# Patient Record
Sex: MALE | Race: BLACK OR AFRICAN AMERICAN | NOT HISPANIC OR LATINO | Employment: OTHER | URBAN - METROPOLITAN AREA
[De-identification: names, ages, dates, MRNs, and addresses within clinical notes are randomized per-mention and may not be internally consistent; named-entity substitution may affect disease eponyms.]

---

## 2017-02-03 DIAGNOSIS — Z76.89 ESTABLISHING CARE WITH NEW DOCTOR, ENCOUNTER FOR: Primary | ICD-10-CM

## 2017-02-06 ENCOUNTER — CLINICAL SUPPORT (OUTPATIENT)
Dept: CARDIOLOGY | Facility: CLINIC | Age: 72
End: 2017-02-06
Payer: COMMERCIAL

## 2017-02-06 ENCOUNTER — OFFICE VISIT (OUTPATIENT)
Dept: CARDIOLOGY | Facility: CLINIC | Age: 72
End: 2017-02-06
Payer: COMMERCIAL

## 2017-02-06 ENCOUNTER — HOSPITAL ENCOUNTER (OUTPATIENT)
Dept: RADIOLOGY | Facility: HOSPITAL | Age: 72
Discharge: HOME | End: 2017-02-06
Attending: INTERNAL MEDICINE
Payer: COMMERCIAL

## 2017-02-06 VITALS — WEIGHT: 256.31 LBS | DIASTOLIC BLOOD PRESSURE: 84 MMHG | SYSTOLIC BLOOD PRESSURE: 134 MMHG

## 2017-02-06 DIAGNOSIS — R94.39 ABNORMAL CARDIOVASCULAR STRESS TEST: ICD-10-CM

## 2017-02-06 DIAGNOSIS — E78.00 PURE HYPERCHOLESTEROLEMIA: ICD-10-CM

## 2017-02-06 DIAGNOSIS — Z76.89 ESTABLISHING CARE WITH NEW DOCTOR, ENCOUNTER FOR: ICD-10-CM

## 2017-02-06 DIAGNOSIS — I10 ESSENTIAL HYPERTENSION: Primary | ICD-10-CM

## 2017-02-06 DIAGNOSIS — I10 ESSENTIAL HYPERTENSION: ICD-10-CM

## 2017-02-06 PROCEDURE — 99205 OFFICE O/P NEW HI 60 MIN: CPT | Mod: S$PBB,,, | Performed by: INTERNAL MEDICINE

## 2017-02-06 PROCEDURE — 71020 XR CHEST PA AND LATERAL: CPT | Mod: TC

## 2017-02-06 PROCEDURE — 71020 XR CHEST PA AND LATERAL: CPT | Mod: 26,,, | Performed by: RADIOLOGY

## 2017-02-06 PROCEDURE — 99212 OFFICE O/P EST SF 10 MIN: CPT | Mod: PBBFAC | Performed by: INTERNAL MEDICINE

## 2017-02-06 PROCEDURE — 93010 ELECTROCARDIOGRAM REPORT: CPT | Mod: S$PBB,,, | Performed by: INTERNAL MEDICINE

## 2017-02-06 PROCEDURE — 99999 PR PBB SHADOW E&M-EST. PATIENT-LVL II: CPT | Mod: PBBFAC,,, | Performed by: INTERNAL MEDICINE

## 2017-02-06 PROCEDURE — 93005 ELECTROCARDIOGRAM TRACING: CPT | Mod: PBBFAC | Performed by: INTERNAL MEDICINE

## 2017-02-06 RX ORDER — ATORVASTATIN CALCIUM 80 MG/1
80 TABLET, FILM COATED ORAL DAILY
COMMUNITY

## 2017-02-06 RX ORDER — SERTRALINE HYDROCHLORIDE 100 MG/1
100 TABLET, FILM COATED ORAL DAILY
COMMUNITY

## 2017-02-06 RX ORDER — ALLOPURINOL 300 MG/1
300 TABLET ORAL DAILY
COMMUNITY

## 2017-02-06 RX ORDER — LISINOPRIL AND HYDROCHLOROTHIAZIDE 12.5; 2 MG/1; MG/1
1 TABLET ORAL DAILY
COMMUNITY

## 2017-02-06 RX ORDER — FOLIC ACID/MULTIVIT,IRON,MINER 0.4MG-18MG
TABLET ORAL
COMMUNITY

## 2017-02-06 RX ORDER — FLUTICASONE PROPIONATE 50 MCG
1 SPRAY, SUSPENSION (ML) NASAL DAILY
COMMUNITY

## 2017-02-06 RX ORDER — SILDENAFIL 100 MG/1
100 TABLET, FILM COATED ORAL DAILY PRN
COMMUNITY

## 2017-02-06 NOTE — PROGRESS NOTES
Subjective:   Patient ID:  Mahin Newton is a 71 y.o. male who presents for follow-up of abnormal stress test.   Stress echo (+) in LAD territory.Stress test ordered for preop clearance. Pt with CP relieved with H2 blockers.  Pt with fatigue and ZAPIEN occasionally. Pt also occasional fatigue lately.    Hypertension   This is a chronic problem. The current episode started more than 1 year ago. The problem has been gradually improving since onset. The problem is controlled. Pertinent negatives include no chest pain, palpitations or shortness of breath. Past treatments include ACE inhibitors. The current treatment provides moderate improvement. Compliance problems include exercise.    Hyperlipidemia   This is a chronic problem. The current episode started more than 1 year ago. The problem is controlled. Recent lipid tests were reviewed and are variable. Pertinent negatives include no chest pain or shortness of breath. Current antihyperlipidemic treatment includes statins. The current treatment provides moderate improvement of lipids. Compliance problems include adherence to exercise.        Review of Systems   Constitution: Negative. Negative for weight gain.   HENT: Negative.    Eyes: Negative.    Cardiovascular: Negative.  Negative for chest pain, leg swelling and palpitations.   Respiratory: Negative.  Negative for shortness of breath.    Endocrine: Negative.    Hematologic/Lymphatic: Negative.    Skin: Negative.    Musculoskeletal: Negative for muscle weakness.   Gastrointestinal: Negative.    Genitourinary: Negative.    Neurological: Negative.  Negative for dizziness.   Psychiatric/Behavioral: Negative.    Allergic/Immunologic: Negative.      No family history on file.  Past Medical History   Diagnosis Date    Diabetes mellitus     Gout     Hyperlipidemia     Hypertension      No current outpatient prescriptions on file prior to visit.     No current facility-administered medications on file prior to visit.       Review of patient's allergies indicates:  No Known Allergies    Objective:     Physical Exam   Constitutional: He is oriented to person, place, and time. He appears well-developed and well-nourished.   HENT:   Head: Normocephalic and atraumatic.   Eyes: Conjunctivae are normal. Pupils are equal, round, and reactive to light.   Neck: Normal range of motion. Neck supple.   Cardiovascular: Normal rate, regular rhythm, normal heart sounds and intact distal pulses.    Pulmonary/Chest: Effort normal and breath sounds normal.   Abdominal: Soft. Bowel sounds are normal.   Neurological: He is alert and oriented to person, place, and time.   Skin: Skin is warm and dry.   Psychiatric: He has a normal mood and affect.   Nursing note and vitals reviewed.      Assessment:     1. Essential hypertension    2. Pure hypercholesterolemia    3. Abnormal cardiovascular stress test        Plan:     Essential hypertension    Pure hypercholesterolemia    Abnormal cardiovascular stress test      C  continue statin-hlp  Continue lisinopril-htn

## 2017-02-06 NOTE — MR AVS SNAPSHOT
[facility]  [address]  [place] [address]  Phone: [phone]  Fax: [phone]                  Mahin Newton   2017 9:20 AM   Office Visit    Description:  Male : 1945   Provider:  García Shaffer MD   Department:  [facility]           Diagnoses this Visit        Comments    Essential hypertension    -  Primary     Pure hypercholesterolemia         Abnormal cardiovascular stress test                To Do List           Goals (5 Years of Data)     None      Follow-Up and Disposition     Return in about 2 months (around 2017).      [facility]     [facility] On Call Nurse Care Line -  Assistance  Registered nurses in the [facility] Call Center provide clinical advisement, health education, appointment booking, and other advisory services.  Call for this free service at [phone].             Medications           Message regarding Medications     Verify the changes and/or additions to your medication regime listed below are the same as discussed with your clinician today.  If any of these changes or additions are incorrect, please notify your healthcare provider.             Verify that the below list of medications is an accurate representation of the medications you are currently taking.  If none reported, the list may be blank. If incorrect, please contact your healthcare provider. Carry this list with you in case of emergency.           Current Medications     allopurinol (ZYLOPRIM) 300 MG tablet Take 300 mg by mouth once daily.    atorvastatin (LIPITOR) 80 MG tablet Take 80 mg by mouth once daily.    cholecalciferol, vitamin D3, (VITAMIN D3) 400 unit Chew Take by mouth. Take two tablets by mouth daily    fluticasone (FLONASE) 50 mcg/actuation nasal spray 1 spray by Each Nare route once daily.    lisinopril-hydrochlorothiazide (PRINZIDE,ZESTORETIC) 20-12.5 mg per tablet Take 1 tablet by mouth once daily.    sertraline (ZOLOFT) 100 MG  tablet Take 100 mg by mouth once daily.    sildenafil (VIAGRA) 100 MG tablet Take 100 mg by mouth daily as needed for Erectile Dysfunction.           Clinical Reference Information           Your Vitals Were     BP Weight                134/84 (BP Location: Right arm, Patient Position: Sitting, BP Method: Manual) 116.3 kg (256 lb 4.6 oz)          Blood Pressure          Most Recent Value    Right Arm BP - Sitting  134/84    Left Arm BP - Sitting  138/88    BP  134/84      Allergies as of 2/6/2017     No Known Allergies      Immunizations Administered on Date of Encounter - 2/6/2017     None      Orders Placed During Today's Visit     Future Labs/Procedures Expected by Expires    Cath lab procedure  As directed 2/6/2018      [facility] Sign-Up     Activating your [facility] account is as easy as 1-2-3!     1) Visit [email or web address], select Sign Up Now, enter this activation code and your date of birth, then select Next.  [email or web address]  Expires: 3/23/2017 10:30 AM      2) Create a username and password to use when you visit [facility] in the future and select a security question in case you lose your password and select Next.    3) Enter your e-mail address and click Sign Up!    Additional Information  If you have questions, please e-mail [email or web address] or call [phone] to talk to our [facility] staff. Remember, [facility] is NOT to be used for urgent needs. For medical emergencies, dial 911.         Language Assistance Services     ATTENTION: Language assistance services are available, free of charge. Please call [phone].      ATENCIÓN: Si habla español, tiene a segundo disposición servicios gratuitos de asistencia lingüística. Llame al [phone].     CHÚ Ý: N?u b?n nói Ti?ng Vi?t, có các d?ch v? h? tr? ngôn ng? mi?n phí dành cho b?n. G?i s? [phone].         [facility] complies with applicable Federal civil rights laws and does not discriminate on the basis of race, color, national  origin, age, disability, or sex.

## 2017-02-09 ENCOUNTER — HOSPITAL ENCOUNTER (OUTPATIENT)
Facility: HOSPITAL | Age: 72
Discharge: HOME | End: 2017-02-09
Attending: INTERNAL MEDICINE | Admitting: INTERNAL MEDICINE
Payer: COMMERCIAL

## 2017-02-09 VITALS
OXYGEN SATURATION: 97 % | WEIGHT: 251 LBS | HEIGHT: 70 IN | DIASTOLIC BLOOD PRESSURE: 66 MMHG | SYSTOLIC BLOOD PRESSURE: 114 MMHG | TEMPERATURE: 97 F | RESPIRATION RATE: 18 BRPM | HEART RATE: 82 BPM | BODY MASS INDEX: 35.93 KG/M2

## 2017-02-09 DIAGNOSIS — R94.39 ABNORMAL CARDIOVASCULAR STRESS TEST: ICD-10-CM

## 2017-02-09 DIAGNOSIS — R07.9 CHEST PAIN, UNSPECIFIED TYPE: ICD-10-CM

## 2017-02-09 DIAGNOSIS — I10 ESSENTIAL (PRIMARY) HYPERTENSION: ICD-10-CM

## 2017-02-09 PROCEDURE — 63600175 PHARM REV CODE 636 W HCPCS

## 2017-02-09 PROCEDURE — C1769 GUIDE WIRE: HCPCS

## 2017-02-09 PROCEDURE — 25000003 PHARM REV CODE 250

## 2017-02-09 PROCEDURE — 93458 L HRT ARTERY/VENTRICLE ANGIO: CPT | Mod: 26,,, | Performed by: INTERNAL MEDICINE

## 2017-02-09 RX ORDER — NITROGLYCERIN 0.4 MG/1
0.4 TABLET SUBLINGUAL EVERY 5 MIN PRN
Status: DISCONTINUED | OUTPATIENT
Start: 2017-02-09 | End: 2017-02-09 | Stop reason: HOSPADM

## 2017-02-09 RX ORDER — DIPHENHYDRAMINE HCL 50 MG
50 CAPSULE ORAL ONCE
Status: COMPLETED | OUTPATIENT
Start: 2017-02-09 | End: 2017-02-09

## 2017-02-09 RX ORDER — ATROPINE SULFATE 0.1 MG/ML
0.5 INJECTION INTRAVENOUS
Status: DISCONTINUED | OUTPATIENT
Start: 2017-02-09 | End: 2017-02-09 | Stop reason: HOSPADM

## 2017-02-09 RX ORDER — SODIUM CHLORIDE 9 MG/ML
INJECTION, SOLUTION INTRAVENOUS CONTINUOUS
Status: DISCONTINUED | OUTPATIENT
Start: 2017-02-09 | End: 2017-02-09 | Stop reason: HOSPADM

## 2017-02-09 RX ORDER — DIAZEPAM 5 MG/1
5 TABLET ORAL
Status: DISCONTINUED | OUTPATIENT
Start: 2017-02-09 | End: 2017-02-09 | Stop reason: HOSPADM

## 2017-02-09 RX ORDER — HYDROCODONE BITARTRATE AND ACETAMINOPHEN 5; 325 MG/1; MG/1
1 TABLET ORAL EVERY 4 HOURS PRN
Status: DISCONTINUED | OUTPATIENT
Start: 2017-02-09 | End: 2017-02-09 | Stop reason: HOSPADM

## 2017-02-09 RX ORDER — ACETAMINOPHEN 325 MG/1
650 TABLET ORAL EVERY 4 HOURS PRN
Status: DISCONTINUED | OUTPATIENT
Start: 2017-02-09 | End: 2017-02-09 | Stop reason: HOSPADM

## 2017-02-09 RX ORDER — OXYCODONE HYDROCHLORIDE 5 MG/1
10 TABLET ORAL EVERY 4 HOURS PRN
Status: DISCONTINUED | OUTPATIENT
Start: 2017-02-09 | End: 2017-02-09 | Stop reason: HOSPADM

## 2017-02-09 RX ORDER — METOPROLOL TARTRATE 1 MG/ML
5 INJECTION, SOLUTION INTRAVENOUS EVERY 8 HOURS
Status: DISCONTINUED | OUTPATIENT
Start: 2017-02-09 | End: 2017-02-09 | Stop reason: HOSPADM

## 2017-02-09 RX ADMIN — SODIUM CHLORIDE: 9 INJECTION, SOLUTION INTRAVENOUS at 09:02

## 2017-02-09 RX ADMIN — Medication 50 MG: at 09:02

## 2017-02-09 RX ADMIN — DIAZEPAM 5 MG: 5 TABLET ORAL at 09:02

## 2017-02-09 NOTE — PROCEDURES
Procedure Date: 02/09/2017  Procedure:cardioversion  Assistant: none  Specimen: none  : ELFEGO RAMESH MD  Diagnosis:atrial fib w/ RVR  Sedation:conscious  Complications:none  Blood loss: none  Findings:DCCV 100 J, 200J x 2 but persistant atrial fibrillation, start IV lopressor

## 2017-02-09 NOTE — PLAN OF CARE
Problem: Patient Care Overview  Goal: Individualization & Mutuality  Outcome: Ongoing (interventions implemented as appropriate)  Shea FOSTER AT BEDSIDE [phone]

## 2017-02-09 NOTE — PLAN OF CARE
Problem: Patient Care Overview  Goal: Discharge Needs Assessment  Outcome: Outcome(s) achieved Date Met:  02/09/17  1230 ambulated around unit w/o problems.  DISCHARGE INST. REVIEWED, COPY GIVEN.  1235 IV REMOVED.  1240 DISCHARGE HOME. TO FRONT DOORS VIA W/C

## 2017-02-09 NOTE — BRIEF OP NOTE
Procedure Date: 02/09/2017  Procedure:[facility]  Assistant: none  Specimen: none  : ELFEGO RAMESH MD  Diagnosis:(+) stress test  Sedation:conscious  Complications:none  Blood loss :< 50 cc  Findings:normal coronaries, nml lv function

## 2017-02-09 NOTE — DISCHARGE INSTRUCTIONS
"Post-op Heart Catheterization    1. DIET: It is advisable for you to follow a diet that limits the intake of salt, sugar, saturated fats and cholesterol.     2. DRIVING: Due to sedation you received during your procedure, DO NOT drive or operate machinery for 24 hours. Avoid making critical decisions or signing legal documents until tomorrow.    3. ACTIVITY: AVOID activities that require bending of the affected arm/wrist for 3 days and submerging the site in water for 3 days. REMOVE the dressing the day after  the procedure, you may apply a bandaid to the site if you desire. You may RESUME       your normal activities or prescribed exercise program as instructed by your physician after 3 days.                                                                                                                 4. WOUND CARE: It is not unusual to have a small amount of bruising to appear at or near the puncture site. It is also common to have a tender "knot" develop beneath the skin at the puncture site of the wrist/arm. This is usually scar tissue and is not a cause for concern or alarm. This tender knot may take several weeks to fully resolve. The bruise will usually spread over several days. However, if the lump gets bigger, call your doctor immediately.    5. DISCOMFORT: For general discomfort at the puncture site, you may take 1 or 2 Acetaminophen (Tylenol) tablets every 4 - 6 hours as needed. (Do not take more than 4000 mg a day)    6. SIGNS AND SYMPTOMS TO REPORT:  Call your physician immediately if any of the following occur:                                            1. Loss of feeling, warmth or color to the affected arm/wrist                                                                                                          2. Mild beeding from the site                 3. Pain that is sudden, sharp or persistent in the affected arm/wrist                 4. Swelling or a change in "lump" size, increased " redness or drainage at                               the puncture site                                                                               5. High fever (101 degrees or higher)    7. GO TO  THE EMERGENCY ROOM OR CALL 911 IF YOU HAVE: Chest pains or discomforts not relieved with 3 nitroglycerin doses (sublingual tablets or spray), numbness or severe pain of limb, if your limb becomes cold or discolored or if you develop uncontrolled bleeding from the puncture site (quickly apply firm, direct pressure above the site).

## 2017-02-09 NOTE — IP AVS SNAPSHOT
[facility]  [id number] [place] [address] [address] [place] [place] [id number]           Patient Discharge Instructions     Our goal is to set you up for success. This packet includes information on your condition, medications, and your home care. It will help you to care for yourself so you don't get sicker and need to go back to the hospital.     Please ask your nurse if you have any questions.        There are many details to remember when preparing to leave the hospital. Here is what you will need to do:    1. Take your medicine. If you are prescribed medications, review your Medication List in the following pages. You may have new medications to  at the pharmacy and others that you'll need to stop taking. Review the instructions for how and when to take your medications. Talk with your doctor or nurses if you are unsure of what to do.     2. Go to your follow-up appointments. Specific follow-up information is listed in the following pages. Your may be contacted by a transition nurse or clinical provider about future appointments. Be sure we have all of the phone numbers to reach you, if needed. Please contact your provider's office if you are unable to make an appointment.     3. Watch for warning signs. Your doctor or nurse will give you detailed warning signs to watch for and when to call for assistance. These instructions may also include educational information about your condition. If you experience any of warning signs to your health, call your doctor.               [facility]  Unless otherwise directed by your provider, please contact [facility], our nurse care line that is available for 24/7 assistance.     [phone] (toll-free)    Registered nurses in the [facility] provide clinical advisement, health education, appointment booking, and other advisory services.                    ** Verify the list of medication(s) below is accurate and up to date. Carry this with you  in case of emergency. If your medications have changed, please notify your healthcare provider.             Medication List      CONTINUE taking these medications        Additional Info                      allopurinol 300 MG tablet   Commonly known as:  ZYLOPRIM   Refills:  0   Dose:  300 mg    Instructions:  Take 300 mg by mouth once daily.     Begin Date    AM    Noon    PM    Bedtime       atorvastatin 80 MG tablet   Commonly known as:  LIPITOR   Refills:  0   Dose:  80 mg    Instructions:  Take 80 mg by mouth once daily.     Begin Date    AM    Noon    PM    Bedtime       fluticasone 50 mcg/actuation nasal spray   Commonly known as:  FLONASE   Refills:  0   Dose:  1 spray    Instructions:  1 spray by Each Nare route once daily.     Begin Date    AM    Noon    PM    Bedtime       lisinopril-hydrochlorothiazide 20-12.5 mg per tablet   Commonly known as:  PRINZIDE,ZESTORETIC   Refills:  0   Dose:  1 tablet    Instructions:  Take 1 tablet by mouth once daily.     Begin Date    AM    Noon    PM    Bedtime       sertraline 100 MG tablet   Commonly known as:  ZOLOFT   Refills:  0   Dose:  100 mg    Instructions:  Take 100 mg by mouth once daily.     Begin Date    AM    Noon    PM    Bedtime       sildenafil 100 MG tablet   Commonly known as:  VIAGRA   Refills:  0   Dose:  100 mg    Instructions:  Take 100 mg by mouth daily as needed for Erectile Dysfunction.     Begin Date    AM    Noon    PM    Bedtime       VITAMIN D3 400 unit Chew   Refills:  0   Generic drug:  cholecalciferol (vitamin D3)    Instructions:  Take by mouth. Take two tablets by mouth daily     Begin Date    AM    Noon    PM    Bedtime                  Please bring to all follow up appointments:    1. A copy of your discharge instructions.  2. All medicines you are currently taking in their original bottles.  3. Identification and insurance card.    Please arrive 15 minutes ahead of scheduled appointment time.    Please call 24 hours in advance if you  "must reschedule your appointment and/or time.        Follow-up Information     Follow up In 1 week.    Why:  For wound re-check        Discharge Instructions     Future Orders    Activity as tolerated     Comments:    Refer to nursing instructions    Call MD for:  difficulty breathing, headache or visual disturbances     Call MD for:  extreme fatigue     Call MD for:  hives     Call MD for:  persistent dizziness or light-headedness     Call MD for:  persistent nausea and vomiting     Call MD for:  redness, tenderness, or signs of infection (pain, swelling, redness, odor or green/yellow discharge around incision site)     Call MD for:  severe uncontrolled pain     Call MD for:  temperature >100.4     Diet general     Questions:    Total calories:      Fat restriction, if any:      Protein restriction, if any:      Na restriction, if any:      Fluid restriction:      Additional restrictions:  Cardiac (Low Na/Chol)        Discharge Instructions       Post-op Heart Catheterization    1. DIET: It is advisable for you to follow a diet that limits the intake of salt, sugar, saturated fats and cholesterol.     2. DRIVING: Due to sedation you received during your procedure, DO NOT drive or operate machinery for 24 hours. Avoid making critical decisions or signing legal documents until tomorrow.    3. ACTIVITY: AVOID activities that require bending of the affected arm/wrist for 3 days and submerging the site in water for 3 days. REMOVE the dressing the day after  the procedure, you may apply a bandaid to the site if you desire. You may RESUME       your normal activities or prescribed exercise program as instructed by your physician after 3 days.                                                                                                                 4. WOUND CARE: It is not unusual to have a small amount of bruising to appear at or near the puncture site. It is also common to have a tender "knot" develop beneath the " "skin at the puncture site of the wrist/arm. This is usually scar tissue and is not a cause for concern or alarm. This tender knot may take several weeks to fully resolve. The bruise will usually spread over several days. However, if the lump gets bigger, call your doctor immediately.    5. DISCOMFORT: For general discomfort at the puncture site, you may take 1 or 2 Acetaminophen (Tylenol) tablets every 4 - 6 hours as needed. (Do not take more than 4000 mg a day)    6. SIGNS AND SYMPTOMS TO REPORT:  Call your physician immediately if any of the following occur:                                            1. Loss of feeling, warmth or color to the affected arm/wrist                                                                                                          2. Mild beeding from the site                 3. Pain that is sudden, sharp or persistent in the affected arm/wrist                 4. Swelling or a change in "lump" size, increased redness or drainage at                               the puncture site                                                                               5. High fever (101 degrees or higher)    7. GO TO  THE EMERGENCY ROOM OR CALL 911 IF YOU HAVE: Chest pains or discomforts not relieved with 3 nitroglycerin doses (sublingual tablets or spray), numbness or severe pain of limb, if your limb becomes cold or discolored or if you develop uncontrolled bleeding from the puncture site (quickly apply firm, direct pressure above the site).        Admission Information     Date & Time Provider Department CSN    2/9/2017  8:27 AM García Shaffer MD [facility] [id number]      Care Providers     Provider Role Specialty Primary office phone    García Shaffer MD Attending Provider Cardiology [phone]    García Shaffer MD Surgeon  Cardiology [phone]      Your Vitals Were     BP Pulse Temp Resp Height Weight    149/82 63 97.4 °F (36.3 °C) (Oral) 19 5' 10" " (1.778 m) 113.9 kg (251 lb)    SpO2 BMI             94% 36.01 kg/m2         Recent Lab Values     No lab values to display.      Allergies as of 2/9/2017     No Known Allergies      Advance Directives     An advance directive is a document which, in the event you are no longer able to make decisions for yourself, tells your healthcare team what kind of treatment you do or do not want to receive, or who you would like to make those decisions for you.  If you do not currently have an advance directive, [facility] encourages you to create one.  For more information call:  [phone]), [phone] ([phone]),  or log on to [email or web address].        Smoking Cessation     If you would like to quit smoking:   You may be eligible for free services if you are a [place] resident and started smoking cigarettes before September 1, 1988.  Call the Smoking Cessation [employer] ([facility]) toll free at (905) [phone] or (715) 996-8226.   Call 1-395-QUIT-NOW if you do not meet the above criteria.            Language Assistance Services     ATTENTION: Language assistance services are available, free of charge. Please call [phone].      ATENCIÓN: Si habla carl, tiene a segundo disposición servicios gratuitos de asistencia lingüística. Llame al [phone].     CHÚ Ý: N?u b?n nói Ti?ng Vi?t, có các d?ch v? h? tr? ngôn ng? mi?n phí dành cho b?n. G?i s? [phone].        [facility] Sign-Up     Activating your [facility] account is as easy as 1-2-3!     1) Visit [email or web address], select Sign Up Now, enter this activation code and your date of birth, then select Next.  [email or web address]  Expires: 3/23/2017 10:30 AM      2) Create a username and password to use when you visit [facility] in the future and select a security question in case you lose your password and select Next.    3) Enter your e-mail address and click Sign Up!    Additional Information  If you have questions, please e-mail [email or web address]  or call [phone] to talk to our [facility] staff. Remember, [facility] is NOT to be used for urgent needs. For medical emergencies, dial 911.          [facility] complies with applicable Federal civil rights laws and does not discriminate on the basis of race, color, national origin, age, disability, or sex.

## 2017-02-12 NOTE — DISCHARGE SUMMARY
[facility]  Discharge Summary      Admit Date: 2/9/2017    Discharge Date and Time: 2/9/2017  Attending Physician: García Shaffer MD  Reason for Admission: [facility]    Procedures Performed: Procedure(s) (LRB):  HEART CATH-LEFT (Left)    Hospital Course (synopsis of major diagnoses, care, treatment, and services provided during the course of the hospital stay): abnormal stress test    Consults: none    Significant Diagnostic Studies: Angiography: [facility]-normal    Final Diagnoses:    Principal Problem: Abnormal stress test   Secondary Diagnoses:   Active Hospital Problems    Diagnosis  POA    Abnormal cardiovascular stress test [R94.39]  Yes     Priority: High      Resolved Hospital Problems    Diagnosis Date Resolved POA   No resolved problems to display.       Discharged Condition: good    Disposition: Home or Self Care    Follow Up/Patient Instructions:     Medications:  Reconciled Home Medications:   Discharge Medication List as of 2/9/2017 11:19 AM      CONTINUE these medications which have NOT CHANGED    Details   allopurinol (ZYLOPRIM) 300 MG tablet Take 300 mg by mouth once daily., Until Discontinued, Historical Med      atorvastatin (LIPITOR) 80 MG tablet Take 80 mg by mouth once daily., Until Discontinued, Historical Med      cholecalciferol, vitamin D3, (VITAMIN D3) 400 unit Chew Take by mouth. Take two tablets by mouth daily, Until Discontinued, Historical Med      fluticasone (FLONASE) 50 mcg/actuation nasal spray 1 spray by Each Nare route once daily., Until Discontinued, Historical Med      lisinopril-hydrochlorothiazide (PRINZIDE,ZESTORETIC) 20-12.5 mg per tablet Take 1 tablet by mouth once daily., Until Discontinued, Historical Med      sertraline (ZOLOFT) 100 MG tablet Take 100 mg by mouth once daily., Until Discontinued, Historical Med      sildenafil (VIAGRA) 100 MG tablet Take 100 mg by mouth daily as needed for Erectile Dysfunction., Until Discontinued, Historical Med              Discharge Procedure Orders  Diet general   Order Specific Question Answer Comments   Additional restrictions: Cardiac (Low Na/Chol)      Activity as tolerated   Order Comments: Refer to nursing instructions     Call MD for:  temperature >100.4     Call MD for:  persistent nausea and vomiting     Call MD for:  severe uncontrolled pain     Call MD for:  difficulty breathing, headache or visual disturbances     Call MD for:  redness, tenderness, or signs of infection (pain, swelling, redness, odor or green/yellow discharge around incision site)     Call MD for:  hives     Call MD for:  persistent dizziness or light-headedness     Call MD for:  extreme fatigue       Follow-up Information     Follow up In 1 week.    Why:  For wound re-check

## 2024-01-05 ENCOUNTER — OFFICE VISIT (OUTPATIENT)
Dept: OPHTHALMOLOGY | Facility: CLINIC | Age: 79
End: 2024-01-05
Payer: MEDICARE

## 2024-01-05 DIAGNOSIS — H25.13 CATARACT, NUCLEAR SCLEROTIC SENILE, BILATERAL: ICD-10-CM

## 2024-01-05 DIAGNOSIS — H52.7 REFRACTIVE ERRORS: ICD-10-CM

## 2024-01-05 DIAGNOSIS — H40.013 AT LOW RISK FOR OPEN-ANGLE GLAUCOMA IN BOTH EYES: Primary | ICD-10-CM

## 2024-01-05 PROCEDURE — 99202 OFFICE O/P NEW SF 15 MIN: CPT | Mod: PBBFAC | Performed by: OPTOMETRIST

## 2024-01-05 PROCEDURE — 92004 COMPRE OPH EXAM NEW PT 1/>: CPT | Mod: S$PBB,,, | Performed by: OPTOMETRIST

## 2024-01-05 PROCEDURE — 92015 DETERMINE REFRACTIVE STATE: CPT | Mod: ,,, | Performed by: OPTOMETRIST

## 2024-01-05 PROCEDURE — 99999 PR PBB SHADOW E&M-NEW PATIENT-LVL II: CPT | Mod: PBBFAC,,, | Performed by: OPTOMETRIST

## 2024-01-05 NOTE — PROGRESS NOTES
"SUBJECTIVE  Mahin Newton is 78 y.o. male  Corrected distance visual acuity was 20/20 -1 in the right eye and 20/20 -2 in the left eye. Corrected near visual acuity was J1 in the right eye and J1 in the left eye.   Chief Complaint   Patient presents with    Diabetic Eye Exam    Hypertensive Eye Exam    Eye Exam          HPI    NIDDM exam   No visual complaints  New patient last eye exam 1 year  Update glasses RX.  No results found for: "LABA1C", "HGBA1C"    Last edited by Puja Grimes MA on 1/5/2024  3:51 PM.         Assessment /Plan :  1. At low risk for open-angle glaucoma in both eyes   Elevated IOP OU, normal ONH, normal gOCT GCL 32'S     2. Cataract, nuclear sclerotic senile, bilateral   Moderate cataracts OU, not surgical  Follow annually.     3. Refractive errors   Dispense Final Rx for glasses.  RTC 1 year VF gOCT DFE  Discussed above and answered questions.                 "

## 2024-06-24 ENCOUNTER — OFFICE VISIT (OUTPATIENT)
Dept: UROLOGY | Facility: CLINIC | Age: 79
End: 2024-06-24
Payer: MEDICARE

## 2024-06-24 ENCOUNTER — LAB VISIT (OUTPATIENT)
Dept: LAB | Facility: HOSPITAL | Age: 79
End: 2024-06-24
Attending: NURSE PRACTITIONER
Payer: MEDICARE

## 2024-06-24 VITALS
DIASTOLIC BLOOD PRESSURE: 67 MMHG | HEIGHT: 70 IN | BODY MASS INDEX: 35.07 KG/M2 | SYSTOLIC BLOOD PRESSURE: 125 MMHG | HEART RATE: 94 BPM | WEIGHT: 244.94 LBS

## 2024-06-24 DIAGNOSIS — Z12.5 PROSTATE CANCER SCREENING: ICD-10-CM

## 2024-06-24 DIAGNOSIS — N40.0 BENIGN PROSTATIC HYPERPLASIA WITHOUT LOWER URINARY TRACT SYMPTOMS: Primary | ICD-10-CM

## 2024-06-24 PROBLEM — R94.39 ABNORMAL CARDIOVASCULAR STRESS TEST: Status: RESOLVED | Noted: 2017-02-09 | Resolved: 2024-06-24

## 2024-06-24 LAB
BILIRUB UR QL STRIP: NEGATIVE
COMPLEXED PSA SERPL-MCNC: 1.2 NG/ML (ref 0–4)
GLUCOSE UR QL STRIP: POSITIVE
KETONES UR QL STRIP: NEGATIVE
LEUKOCYTE ESTERASE UR QL STRIP: POSITIVE
PH, POC UA: 7.5
POC BLOOD, URINE: POSITIVE
POC NITRATES, URINE: NEGATIVE
POC RESIDUAL URINE VOLUME: 75 ML (ref 0–100)
PROT UR QL STRIP: POSITIVE
SP GR UR STRIP: 1.02 (ref 1–1.03)
UROBILINOGEN UR STRIP-ACNC: 0.2 (ref 0.3–2.2)

## 2024-06-24 PROCEDURE — 99999PBSHW POCT BLADDER SCAN: Mod: PBBFAC,,,

## 2024-06-24 PROCEDURE — 36415 COLL VENOUS BLD VENIPUNCTURE: CPT | Performed by: NURSE PRACTITIONER

## 2024-06-24 PROCEDURE — 99204 OFFICE O/P NEW MOD 45 MIN: CPT | Mod: S$PBB,,, | Performed by: NURSE PRACTITIONER

## 2024-06-24 PROCEDURE — 87088 URINE BACTERIA CULTURE: CPT | Performed by: NURSE PRACTITIONER

## 2024-06-24 PROCEDURE — 51798 US URINE CAPACITY MEASURE: CPT | Mod: PBBFAC | Performed by: NURSE PRACTITIONER

## 2024-06-24 PROCEDURE — 84153 ASSAY OF PSA TOTAL: CPT | Performed by: NURSE PRACTITIONER

## 2024-06-24 PROCEDURE — 87147 CULTURE TYPE IMMUNOLOGIC: CPT | Performed by: NURSE PRACTITIONER

## 2024-06-24 PROCEDURE — 81003 URINALYSIS AUTO W/O SCOPE: CPT | Mod: PBBFAC | Performed by: NURSE PRACTITIONER

## 2024-06-24 PROCEDURE — 99999 PR PBB SHADOW E&M-EST. PATIENT-LVL III: CPT | Mod: PBBFAC,,, | Performed by: NURSE PRACTITIONER

## 2024-06-24 PROCEDURE — 99213 OFFICE O/P EST LOW 20 MIN: CPT | Mod: PBBFAC,25 | Performed by: NURSE PRACTITIONER

## 2024-06-24 PROCEDURE — 99999PBSHW POCT URINALYSIS, DIPSTICK, AUTOMATED, W/O SCOPE: Mod: PBBFAC,,,

## 2024-06-24 PROCEDURE — 87086 URINE CULTURE/COLONY COUNT: CPT | Performed by: NURSE PRACTITIONER

## 2024-06-24 RX ORDER — CARBOXYMETHYLCELLULOSE SODIUM 5 MG/ML
0.4 SOLUTION/ DROPS OPHTHALMIC 3 TIMES DAILY PRN
COMMUNITY

## 2024-06-24 RX ORDER — TAMSULOSIN HYDROCHLORIDE 0.4 MG/1
0.4 CAPSULE ORAL DAILY
Qty: 30 CAPSULE | Refills: 11 | Status: SHIPPED | OUTPATIENT
Start: 2024-06-24 | End: 2025-06-24

## 2024-06-24 RX ORDER — LANOLIN ALCOHOL/MO/W.PET/CERES
100 CREAM (GRAM) TOPICAL DAILY
COMMUNITY

## 2024-06-24 RX ORDER — AMLODIPINE BESYLATE 10 MG/1
10 TABLET ORAL
COMMUNITY

## 2024-06-24 NOTE — PROGRESS NOTES
Chief Complaint:   Prostate cancer screening  BPH    HPI:   Patient is a 78-year-old male that is presenting with slow urinary stream.  Reports he has not been seen by urologist.  No  cancers in his family.  Denies gross hematuria.  No BPH meds.  Urine in clinic indicates leukocytes, blood and glucose, all other parameters are negative.  PVR 75 mL.  Has a remote history of renal stones.  Denies flank pain.      Allergies:  Patient has no known allergies.    Medications:  has a current medication list which includes the following prescription(s): allopurinol, atorvastatin, cholecalciferol (vitamin d3), lisinopril-hydrochlorothiazide, sertraline, amlodipine, carboxymethylcellulose, cyanocobalamin, multivitamin with minerals, and tamsulosin.    Review of Systems:  General: No fever, chills, fatigability, or weight loss.  Skin: No rashes, itching, or changes in color or texture of skin.  Chest: Denies ZAPIEN, cyanosis, wheezing, cough, and sputum production.  Abdomen: Appetite fine. No weight loss. Denies diarrhea, abdominal pain, hematemesis, or blood in stool.  Musculoskeletal: No joint stiffness or swelling. Denies back pain.  : As above.  All other review of systems negative.    PMH:   has a past medical history of Diabetes mellitus, Gout, Hyperlipidemia, and Hypertension.  Renal stones, morbid obesity and BPH    PSH:  Laser lithotripsy    FamHx:  None    SocHx:  reports that he has quit smoking. He does not have any smokeless tobacco history on file. He reports current alcohol use. He reports that he does not use drugs.      Physical Exam:  Vitals:    06/24/24 1258   BP: 125/67   Pulse: 94     General:  Morbidly obese male, A&Ox3, no apparent distress, no deformities  Neck: No masses, normal thyroid  Lungs: normal inspiration, no use of accessory muscles  Heart: normal pulse, no arrhythmias  Abdomen: Soft, NT, ND, no masses, no hernias, no hepatosplenomegaly  Lymphatic: Neck and groin nodes negative  Skin: The  skin is warm and dry. No jaundice.  Ext: No c/c/e.  : Test desc jacques, no abnormalities of epididymus. Penis uncircumcised with normal penile and scrotal skin. Meatus normal. Normal rectal tone, no hemorrhoids. Prost 35 gm no nodules or masses appreciated. SV not palpable. Perineum and anus normal.    Labs/Studies:   See HPI    Impression/Plan:   1. Prostate cancer screening  [facility] is unremarkable, patient was sent for PSA labs.  Does not use the patient portal [employer] functionality, will call him with results and needed follow-up.    2. BPH  Patient was educated on behavior modifications, decrease caffeine, sodas and increase water, to assist with decreasing BPH symptoms.  Tamsulosin was sent to his local pharmacy and patient to return to clinic in 6-8 weeks for re-evaluation.

## 2024-06-26 ENCOUNTER — TELEPHONE (OUTPATIENT)
Dept: UROLOGY | Facility: CLINIC | Age: 79
End: 2024-06-26
Payer: MEDICARE

## 2024-06-26 LAB — BACTERIA UR CULT: ABNORMAL

## 2024-06-26 RX ORDER — AMOXICILLIN AND CLAVULANATE POTASSIUM 875; 125 MG/1; MG/1
1 TABLET, FILM COATED ORAL 2 TIMES DAILY
Qty: 14 TABLET | Refills: 0 | Status: SHIPPED | OUTPATIENT
Start: 2024-06-26 | End: 2024-07-03

## 2024-06-26 NOTE — TELEPHONE ENCOUNTER
Called patient no answer LVM to give us a call back             ----- Message from Gayla Pena NP sent at 6/26/2024  9:00 AM CDT -----  Please call patient, does not use a computer, and inform him that urine culture indicated an infection and Aug[facility]in was sent to his [facility].

## 2024-06-27 ENCOUNTER — TELEPHONE (OUTPATIENT)
Dept: UROLOGY | Facility: CLINIC | Age: 79
End: 2024-06-27
Payer: MEDICARE

## 2024-06-27 NOTE — TELEPHONE ENCOUNTER
I personally call patient and he is aware of positive urine culture, Augmentin was sent to his local [facility]

## 2024-06-27 NOTE — TELEPHONE ENCOUNTER
----- Message from Natty Luz sent at 6/27/2024 10:02 AM CDT -----  Regarding: Self .[phone]   Type: Patient Returning Call    Who Called: Self     Who Left Message for Patient: Yumiko Field MA    Does the patient know what this is regarding?: yes     Would the patient rather a call back or a response via [facility]?  Call back     Best Call Back Number: .[phone]      Additional Information:     Thank you.

## 2024-07-19 ENCOUNTER — HOSPITAL ENCOUNTER (INPATIENT)
Facility: HOSPITAL | Age: 79
LOS: 2 days | Discharge: HOME | DRG: 372 | End: 2024-07-21
Attending: EMERGENCY MEDICINE | Admitting: INTERNAL MEDICINE
Payer: MEDICARE

## 2024-07-19 DIAGNOSIS — D64.9 ANEMIA: ICD-10-CM

## 2024-07-19 DIAGNOSIS — R07.9 CHEST PAIN: ICD-10-CM

## 2024-07-19 DIAGNOSIS — D64.9 SYMPTOMATIC ANEMIA: ICD-10-CM

## 2024-07-19 DIAGNOSIS — A49.8 CLOSTRIDIUM DIFFICILE INFECTION: ICD-10-CM

## 2024-07-19 DIAGNOSIS — K92.1 HEMATOCHEZIA: Primary | ICD-10-CM

## 2024-07-19 PROBLEM — N28.9 RENAL INSUFFICIENCY: Status: ACTIVE | Noted: 2024-07-19

## 2024-07-19 PROBLEM — R19.7 DIARRHEA: Status: ACTIVE | Noted: 2024-07-19

## 2024-07-19 PROBLEM — I95.9 HYPOTENSION: Status: ACTIVE | Noted: 2024-07-19

## 2024-07-19 PROBLEM — R55 SYNCOPE: Status: ACTIVE | Noted: 2024-07-19

## 2024-07-19 PROBLEM — N40.0 BPH (BENIGN PROSTATIC HYPERPLASIA): Status: ACTIVE | Noted: 2024-07-19

## 2024-07-19 PROBLEM — Z87.39 H/O: GOUT: Status: ACTIVE | Noted: 2024-07-19

## 2024-07-19 PROBLEM — E78.5 HYPERLIPIDEMIA: Status: ACTIVE | Noted: 2024-07-19

## 2024-07-19 PROBLEM — K92.2 LOWER GI BLEED: Status: ACTIVE | Noted: 2024-07-19

## 2024-07-19 PROBLEM — I10 PRIMARY HYPERTENSION: Status: ACTIVE | Noted: 2024-07-19

## 2024-07-19 PROBLEM — E11.9 DIABETES MELLITUS TYPE 2, NONINSULIN DEPENDENT: Status: ACTIVE | Noted: 2024-07-19

## 2024-07-19 LAB
ABO + RH BLD: NORMAL
ALBUMIN SERPL BCP-MCNC: 3 G/DL (ref 3.5–5.2)
ALP SERPL-CCNC: 64 U/L (ref 55–135)
ALT SERPL W/O P-5'-P-CCNC: 14 U/L (ref 10–44)
ANION GAP SERPL CALC-SCNC: 9 MMOL/L (ref 8–16)
ANISOCYTOSIS BLD QL SMEAR: SLIGHT
AST SERPL-CCNC: 15 U/L (ref 10–40)
BACTERIA #/AREA URNS HPF: ABNORMAL /HPF
BASOPHILS # BLD AUTO: 0.02 K/UL (ref 0–0.2)
BASOPHILS NFR BLD: 0.2 % (ref 0–1.9)
BILIRUB SERPL-MCNC: 0.3 MG/DL (ref 0.1–1)
BILIRUB UR QL STRIP: NEGATIVE
BLD GP AB SCN CELLS X3 SERPL QL: NORMAL
BLD PROD TYP BPU: NORMAL
BLD PROD TYP BPU: NORMAL
BLOOD UNIT EXPIRATION DATE: NORMAL
BLOOD UNIT EXPIRATION DATE: NORMAL
BLOOD UNIT TYPE CODE: 5100
BLOOD UNIT TYPE CODE: 5100
BLOOD UNIT TYPE: NORMAL
BLOOD UNIT TYPE: NORMAL
BUN SERPL-MCNC: 30 MG/DL (ref 8–23)
CALCIUM SERPL-MCNC: 8.9 MG/DL (ref 8.7–10.5)
CHLORIDE SERPL-SCNC: 116 MMOL/L (ref 95–110)
CLARITY UR: CLEAR
CO2 SERPL-SCNC: 20 MMOL/L (ref 23–29)
CODING SYSTEM: NORMAL
CODING SYSTEM: NORMAL
COLOR UR: COLORLESS
CREAT SERPL-MCNC: 1.5 MG/DL (ref 0.5–1.4)
CROSSMATCH INTERPRETATION: NORMAL
CROSSMATCH INTERPRETATION: NORMAL
DACRYOCYTES BLD QL SMEAR: ABNORMAL
DIFFERENTIAL METHOD BLD: ABNORMAL
DISPENSE STATUS: NORMAL
DISPENSE STATUS: NORMAL
EOSINOPHIL # BLD AUTO: 0 K/UL (ref 0–0.5)
EOSINOPHIL NFR BLD: 0.2 % (ref 0–8)
ERYTHROCYTE [DISTWIDTH] IN BLOOD BY AUTOMATED COUNT: 19.1 % (ref 11.5–14.5)
EST. GFR  (NO RACE VARIABLE): 48 ML/MIN/1.73 M^2
GLUCOSE SERPL-MCNC: 185 MG/DL (ref 70–110)
GLUCOSE UR QL STRIP: ABNORMAL
HCT VFR BLD AUTO: 18.1 % (ref 40–54)
HGB BLD-MCNC: 5.1 G/DL (ref 14–18)
HGB UR QL STRIP: ABNORMAL
HYALINE CASTS #/AREA URNS LPF: 1 /LPF
HYPOCHROMIA BLD QL SMEAR: ABNORMAL
IMM GRANULOCYTES # BLD AUTO: 0.06 K/UL (ref 0–0.04)
IMM GRANULOCYTES NFR BLD AUTO: 0.5 % (ref 0–0.5)
INR PPP: 1.1 (ref 0.8–1.2)
KETONES UR QL STRIP: NEGATIVE
LEUKOCYTE ESTERASE UR QL STRIP: ABNORMAL
LYMPHOCYTES # BLD AUTO: 1.3 K/UL (ref 1–4.8)
LYMPHOCYTES NFR BLD: 10.9 % (ref 18–48)
MCH RBC QN AUTO: 20.2 PG (ref 27–31)
MCHC RBC AUTO-ENTMCNC: 28.2 G/DL (ref 32–36)
MCV RBC AUTO: 72 FL (ref 82–98)
MICROSCOPIC COMMENT: ABNORMAL
MONOCYTES # BLD AUTO: 0.6 K/UL (ref 0.3–1)
MONOCYTES NFR BLD: 4.8 % (ref 4–15)
NEUTROPHILS # BLD AUTO: 10.1 K/UL (ref 1.8–7.7)
NEUTROPHILS NFR BLD: 83.4 % (ref 38–73)
NITRITE UR QL STRIP: NEGATIVE
NRBC BLD-RTO: 0 /100 WBC
NUM UNITS TRANS PACKED RBC: NORMAL
NUM UNITS TRANS PACKED RBC: NORMAL
OB PNL STL: POSITIVE
PH UR STRIP: 6 [PH] (ref 5–8)
PLATELET # BLD AUTO: 225 K/UL (ref 150–450)
PMV BLD AUTO: 10 FL (ref 9.2–12.9)
POCT GLUCOSE: 154 MG/DL (ref 70–110)
POTASSIUM SERPL-SCNC: 4.3 MMOL/L (ref 3.5–5.1)
PROT SERPL-MCNC: 5.3 G/DL (ref 6–8.4)
PROT UR QL STRIP: NEGATIVE
PROTHROMBIN TIME: 12.7 SEC (ref 9–12.5)
RBC # BLD AUTO: 2.52 M/UL (ref 4.6–6.2)
RBC #/AREA URNS HPF: 2 /HPF (ref 0–4)
SODIUM SERPL-SCNC: 145 MMOL/L (ref 136–145)
SP GR UR STRIP: >1.03 (ref 1–1.03)
SPECIMEN OUTDATE: NORMAL
URN SPEC COLLECT METH UR: ABNORMAL
UROBILINOGEN UR STRIP-ACNC: NEGATIVE EU/DL
WBC # BLD AUTO: 12.09 K/UL (ref 3.9–12.7)
WBC #/AREA URNS HPF: 55 /HPF (ref 0–5)
WBC CLUMPS URNS QL MICRO: ABNORMAL
YEAST URNS QL MICRO: ABNORMAL

## 2024-07-19 PROCEDURE — 96360 HYDRATION IV INFUSION INIT: CPT

## 2024-07-19 PROCEDURE — 21400001 HC TELEMETRY ROOM

## 2024-07-19 PROCEDURE — 99285 EMERGENCY DEPT VISIT HI MDM: CPT | Mod: 25

## 2024-07-19 PROCEDURE — 93005 ELECTROCARDIOGRAM TRACING: CPT

## 2024-07-19 PROCEDURE — 87086 URINE CULTURE/COLONY COUNT: CPT | Performed by: INTERNAL MEDICINE

## 2024-07-19 PROCEDURE — 86901 BLOOD TYPING SEROLOGIC RH(D): CPT | Performed by: EMERGENCY MEDICINE

## 2024-07-19 PROCEDURE — 85025 COMPLETE CBC W/AUTO DIFF WBC: CPT | Performed by: EMERGENCY MEDICINE

## 2024-07-19 PROCEDURE — 86900 BLOOD TYPING SEROLOGIC ABO: CPT | Performed by: EMERGENCY MEDICINE

## 2024-07-19 PROCEDURE — 86920 COMPATIBILITY TEST SPIN: CPT | Performed by: EMERGENCY MEDICINE

## 2024-07-19 PROCEDURE — 81000 URINALYSIS NONAUTO W/SCOPE: CPT | Performed by: INTERNAL MEDICINE

## 2024-07-19 PROCEDURE — 80053 COMPREHEN METABOLIC PANEL: CPT | Performed by: EMERGENCY MEDICINE

## 2024-07-19 PROCEDURE — 82272 OCCULT BLD FECES 1-3 TESTS: CPT | Performed by: EMERGENCY MEDICINE

## 2024-07-19 PROCEDURE — 25000003 PHARM REV CODE 250: Performed by: EMERGENCY MEDICINE

## 2024-07-19 PROCEDURE — 30233N1 TRANSFUSION OF NONAUTOLOGOUS RED BLOOD CELLS INTO PERIPHERAL VEIN, PERCUTANEOUS APPROACH: ICD-10-PCS | Performed by: EMERGENCY MEDICINE

## 2024-07-19 PROCEDURE — 25500020 PHARM REV CODE 255: Performed by: EMERGENCY MEDICINE

## 2024-07-19 PROCEDURE — 86850 RBC ANTIBODY SCREEN: CPT | Performed by: EMERGENCY MEDICINE

## 2024-07-19 PROCEDURE — 36415 COLL VENOUS BLD VENIPUNCTURE: CPT | Performed by: EMERGENCY MEDICINE

## 2024-07-19 PROCEDURE — 25000003 PHARM REV CODE 250: Performed by: INTERNAL MEDICINE

## 2024-07-19 PROCEDURE — 93010 ELECTROCARDIOGRAM REPORT: CPT | Mod: ,,, | Performed by: INTERNAL MEDICINE

## 2024-07-19 PROCEDURE — P9016 RBC LEUKOCYTES REDUCED: HCPCS | Performed by: EMERGENCY MEDICINE

## 2024-07-19 PROCEDURE — 27000207 HC ISOLATION

## 2024-07-19 PROCEDURE — 85610 PROTHROMBIN TIME: CPT | Performed by: EMERGENCY MEDICINE

## 2024-07-19 RX ORDER — ONDANSETRON HYDROCHLORIDE 2 MG/ML
4 INJECTION, SOLUTION INTRAVENOUS EVERY 6 HOURS PRN
Status: DISCONTINUED | OUTPATIENT
Start: 2024-07-19 | End: 2024-07-21 | Stop reason: HOSPADM

## 2024-07-19 RX ORDER — PANTOPRAZOLE SODIUM 40 MG/10ML
80 INJECTION, POWDER, LYOPHILIZED, FOR SOLUTION INTRAVENOUS ONCE
Status: COMPLETED | OUTPATIENT
Start: 2024-07-19 | End: 2024-07-20

## 2024-07-19 RX ORDER — GLUCAGON 1 MG
1 KIT INJECTION
Status: DISCONTINUED | OUTPATIENT
Start: 2024-07-19 | End: 2024-07-21 | Stop reason: HOSPADM

## 2024-07-19 RX ORDER — ACETAMINOPHEN 325 MG/1
650 TABLET ORAL EVERY 6 HOURS PRN
Status: DISCONTINUED | OUTPATIENT
Start: 2024-07-19 | End: 2024-07-21 | Stop reason: HOSPADM

## 2024-07-19 RX ORDER — PANTOPRAZOLE SODIUM 40 MG/10ML
40 INJECTION, POWDER, LYOPHILIZED, FOR SOLUTION INTRAVENOUS 2 TIMES DAILY
Status: DISCONTINUED | OUTPATIENT
Start: 2024-07-20 | End: 2024-07-20

## 2024-07-19 RX ORDER — NALOXONE HCL 0.4 MG/ML
0.02 VIAL (ML) INJECTION
Status: DISCONTINUED | OUTPATIENT
Start: 2024-07-19 | End: 2024-07-21 | Stop reason: HOSPADM

## 2024-07-19 RX ORDER — SODIUM CHLORIDE 0.9 % (FLUSH) 0.9 %
10 SYRINGE (ML) INJECTION EVERY 12 HOURS PRN
Status: DISCONTINUED | OUTPATIENT
Start: 2024-07-19 | End: 2024-07-21 | Stop reason: HOSPADM

## 2024-07-19 RX ORDER — SODIUM CHLORIDE 9 MG/ML
INJECTION, SOLUTION INTRAVENOUS CONTINUOUS
Status: DISCONTINUED | OUTPATIENT
Start: 2024-07-19 | End: 2024-07-21 | Stop reason: HOSPADM

## 2024-07-19 RX ORDER — INSULIN ASPART 100 [IU]/ML
0-5 INJECTION, SOLUTION INTRAVENOUS; SUBCUTANEOUS
Status: DISCONTINUED | OUTPATIENT
Start: 2024-07-19 | End: 2024-07-21 | Stop reason: HOSPADM

## 2024-07-19 RX ORDER — HYDROCODONE BITARTRATE AND ACETAMINOPHEN 500; 5 MG/1; MG/1
TABLET ORAL
Status: DISCONTINUED | OUTPATIENT
Start: 2024-07-19 | End: 2024-07-21 | Stop reason: HOSPADM

## 2024-07-19 RX ORDER — IBUPROFEN 200 MG
24 TABLET ORAL
Status: DISCONTINUED | OUTPATIENT
Start: 2024-07-19 | End: 2024-07-21 | Stop reason: HOSPADM

## 2024-07-19 RX ORDER — PANTOPRAZOLE SODIUM 40 MG/10ML
40 INJECTION, POWDER, LYOPHILIZED, FOR SOLUTION INTRAVENOUS DAILY
Status: DISCONTINUED | OUTPATIENT
Start: 2024-07-20 | End: 2024-07-19

## 2024-07-19 RX ORDER — HYDRALAZINE HYDROCHLORIDE 20 MG/ML
10 INJECTION INTRAMUSCULAR; INTRAVENOUS EVERY 6 HOURS PRN
Status: DISCONTINUED | OUTPATIENT
Start: 2024-07-19 | End: 2024-07-21 | Stop reason: HOSPADM

## 2024-07-19 RX ORDER — LISINOPRIL 40 MG/1
40 TABLET ORAL DAILY
COMMUNITY

## 2024-07-19 RX ORDER — IBUPROFEN 200 MG
16 TABLET ORAL
Status: DISCONTINUED | OUTPATIENT
Start: 2024-07-19 | End: 2024-07-21 | Stop reason: HOSPADM

## 2024-07-19 RX ADMIN — SODIUM CHLORIDE: 9 INJECTION, SOLUTION INTRAVENOUS at 10:07

## 2024-07-19 RX ADMIN — IOHEXOL 100 ML: 350 INJECTION, SOLUTION INTRAVENOUS at 07:07

## 2024-07-19 RX ADMIN — SODIUM CHLORIDE 1000 ML: 9 INJECTION, SOLUTION INTRAVENOUS at 04:07

## 2024-07-19 NOTE — ED PROVIDER NOTES
SCRIBE #1 NOTE: I, Angelica Hines, am scribing for, and in the presence of, Ja Rai Jr., MD. I have scribed the entire note.       History     Chief Complaint   Patient presents with    Loss of Consciousness     Syncopal episode at home. Orthostatic positive. Mucous membranes pale      Review of patient's allergies indicates:  No Known Allergies      History of Present Illness     HPI    7/19/2024, 4:40 PM  History obtained from the patient      History of Present Illness: Mahin Newton is a 78 y.o. male patient with a PMHx of HTN, DM, HLD and gout who presents to the Emergency Department for evaluation after one syncopal episode this afternoon. Pt reports that he noticed blood in his stool yesterday morning and dizziness today. [facility] and his doctor advised him to come to ED to be evaluated for blood transfusion if sxs worsened. Denies pain after fall/syncopal episode. Pt denies any history of blood transfusions. Pt states he takes low-dose aspirin daily but denies any daily ibuprofen or naproxen use. Pt states he hd a normal colonoscopy two years ago. Symptoms are intermittent and moderate in severity. No mitigating or exacerbating factors reported.No further complaints or concerns at this time.       Arrival mode: EMS     PCP: No, Primary Doctor        Past Medical History:  Past Medical History:   Diagnosis Date    Diabetes mellitus     Gout     Hyperlipidemia     Hypertension        Past Surgical History:  No past surgical history on file.      Family History:  No family history on file.    Social History:  Social History     Tobacco Use    Smoking status: Former    Smokeless tobacco: Not on file   Substance and Sexual Activity    Alcohol use: Yes    Drug use: No    Sexual activity: Yes        Review of Systems     Review of Systems   Gastrointestinal:  Positive for blood in stool.   Neurological:  Positive for dizziness and syncope.      Physical Exam     Initial Vitals  [07/19/24 1627]   BP Pulse Resp Temp SpO2   (!) 90/45 104 16 98.7 °F (37.1 °C) 100 %      MAP       --          Physical Exam  Nursing Notes and Vital Signs Reviewed.  Constitutional: Patient is in no acute distress. Well-developed and well-nourished.  Head: Atraumatic. Normocephalic.  Eyes: PERRL. EOM intact. Conjunctivae are not pale. No scleral icterus.  ENT: Mucous membranes are moist. Oropharynx is clear and symmetric.    Neck: Supple. Full ROM. No lymphadenopathy.  Cardiovascular: Regular rate. Regular rhythm. No murmurs, rubs, or gallops. Distal pulses are 2+ and symmetric.  Pulmonary/Chest: No respiratory distress. Clear to auscultation bilaterally. No wheezing or rales.  Abdominal: Soft and non-distended.  There is no tenderness.  No rebound, guarding, or rigidity.   Genitourinary: No CVA tenderness  Musculoskeletal: Moves all extremities. No obvious deformities. No edema.   Skin: Warm and dry.  Neurological:  Alert, awake, and appropriate.  Normal speech.  No acute focal neurological deficits are appreciated.  Psychiatric: Normal affect. Good eye contact. Appropriate in content.     ED Course   Critical Care    Date/Time: 7/19/2024 8:18 PM    Performed by: Ja Rai Jr., MD  Authorized by: Ja Rai Jr., MD  Direct patient critical care time: 20 minutes  Additional history critical care time: 20 minutes  Ordering / reviewing critical care time: 15 minutes  Documentation critical care time: 10 minutes  Consulting other physicians critical care time: 10 minutes  Total critical care time (exclusive of procedural time) : 75 minutes  Critical care time was exclusive of separately billable procedures and treating other patients and teaching time.  Critical care was necessary to treat or prevent imminent or life-threatening deterioration of the following conditions: anemia requiring blood transfusion.  Critical care was time spent personally by me on the following activities: blood draw for specimens,  "development of treatment plan with patient or surrogate, discussions with consultants, interpretation of cardiac output measurements, evaluation of patient's response to treatment, examination of patient, obtaining history from patient or surrogate, ordering and performing treatments and interventions, ordering and review of laboratory studies, ordering and review of radiographic studies, pulse oximetry, re-evaluation of patient's condition and review of old charts.        ED Vital Signs:  Vitals:    07/20/24 1450 07/20/24 1516 07/20/24 1531 07/20/24 1815   BP: (!) 141/74 138/73 137/81 138/76   Pulse: 92 79 86 88   Resp: 18 18 18 18   Temp: 98.4 °F (36.9 °C) 98.4 °F (36.9 °C) 98.4 °F (36.9 °C) 98.4 °F (36.9 °C)   TempSrc:    Oral   SpO2: 99% 98% 98% 98%   Weight:       Height:        07/20/24 2037 07/20/24 2046 07/21/24 0022 07/21/24 0413   BP: (!) 141/71  132/69    Pulse: 97 92 84 68   Resp: 18  18    Temp: 97.9 °F (36.6 °C)  98.5 °F (36.9 °C)    TempSrc:       SpO2: 98%  96%    Weight:       Height:        07/21/24 0522 07/21/24 0722 07/21/24 0800 07/21/24 0906   BP: 134/67 130/70     Pulse: 76 75 86 92   Resp: 18 18     Temp: 97.8 °F (36.6 °C) 98.2 °F (36.8 °C)     TempSrc:  Tympanic     SpO2: 97% 97%  99%   Weight:  111 kg (244 lb 11.4 oz)     Height:  5' 10" (1.778 m)      07/21/24 1129 07/21/24 1300 07/21/24 1628   BP: 137/79  138/71   Pulse: 82  108   Resp: 16  16   Temp: 98.5 °F (36.9 °C)  98 °F (36.7 °C)   TempSrc: Oral  Oral   SpO2: 97% (!) 94% 96%   Weight:      Height:          Abnormal Lab Results:  Labs Reviewed   CBC W/ AUTO DIFFERENTIAL - Abnormal       Result Value    WBC 12.09      RBC 2.52 (*)     Hemoglobin 5.1 (*)     Hematocrit 18.1 (*)     MCV 72 (*)     MCH 20.2 (*)     MCHC 28.2 (*)     RDW 19.1 (*)     Platelets 225      MPV 10.0      Immature Granulocytes 0.5      Gran # (ANC) 10.1 (*)     Immature Grans (Abs) 0.06 (*)     Lymph # 1.3      Mono # 0.6      Eos # 0.0      Baso # 0.02      " nRBC 0      Gran % 83.4 (*)     Lymph % 10.9 (*)     Mono % 4.8      Eosinophil % 0.2      Basophil % 0.2      Aniso Slight      Hypo Occasional      Tear Drop Cells Occasional      Differential Method Automated      Narrative:     Release to patient->Immediate  hgb and hct critical result(s) called and verbal readback obtained   from genna hay  by AMJuliane 07/19/2024 17:01   COMPREHENSIVE METABOLIC PANEL - Abnormal    Sodium 145      Potassium 4.3      Chloride 116 (*)     CO2 20 (*)     Glucose 185 (*)     BUN 30 (*)     Creatinine 1.5 (*)     Calcium 8.9      Total Protein 5.3 (*)     Albumin 3.0 (*)     Total Bilirubin 0.3      Alkaline Phosphatase 64      AST 15      ALT 14      eGFR 48 (*)     Anion Gap 9      Narrative:     Release to patient->Immediate   PROTIME-INR - Abnormal    Prothrombin Time 12.7 (*)     INR 1.1      Narrative:     Release to patient->Immediate   OCCULT BLOOD X 1, STOOL - Abnormal    Occult Blood Positive (*)    URINALYSIS, REFLEX TO URINE CULTURE - Abnormal    Specimen UA Urine, Clean Catch      Color, UA Colorless (*)     Appearance, UA Clear      pH, UA 6.0      Specific Gravity, UA >1.030 (*)     Protein, UA Negative      Glucose, UA 4+ (*)     Ketones, UA Negative      Bilirubin (UA) Negative      Occult Blood UA Trace (*)     Nitrite, UA Negative      Urobilinogen, UA Negative      Leukocytes, UA 2+ (*)     Narrative:     Specimen Source->Urine   URINALYSIS MICROSCOPIC - Abnormal    RBC, UA 2      WBC, UA 55 (*)     WBC Clumps, UA Many (*)     Bacteria None      Yeast, UA None      Hyaline Casts, UA 1      Microscopic Comment SEE COMMENT      Narrative:     Specimen Source->Urine   POCT GLUCOSE - Abnormal    POCT Glucose 154 (*)    TYPE & SCREEN    Group & Rh O POS      Indirect Tiffany NEG      Specimen Outdate 07/22/2024 23:59     PREPARE RBC SOFT    UNIT NUMBER [id number]      Product Code D9754S60      DISPENSE STATUS TRANSFUSED      CODING SYSTEM [id number]      Unit Blood  Type Code 5100      Unit Blood Type O POS      Unit Expiration [id number]      CROSSMATCH INTERPRETATION Compatible     PREPARE RBC SOFT    UNIT NUMBER [id number]      Product Code K7370S20      DISPENSE STATUS TRANSFUSED      CODING SYSTEM [id number]      Unit Blood Type Code 5100      Unit Blood Type O POS      Unit Expiration [id number]      CROSSMATCH INTERPRETATION Compatible          All Lab Results:  Results for orders placed or performed during the hospital encounter of 07/19/24   Clostridium difficile EIA    Specimen: Stool   Result Value Ref Range    C. diff Antigen Positive (A) Negative    C difficile Toxins A+B, EIA Negative Negative   Urine culture    Specimen: Urine   Result Value Ref Range    Urine Culture, Routine       Multiple organisms isolated. None in predominance.  Repeat if    Urine Culture, Routine clinically necessary.    C Diff Toxin by PCR   Result Value Ref Range    C. diff PCR Positive (A) Negative   CBC auto differential   Result Value Ref Range    WBC 12.09 3.90 - 12.70 K/uL    RBC 2.52 (L) 4.60 - 6.20 M/uL    Hemoglobin 5.1 (LL) 14.0 - 18.0 g/dL    Hematocrit 18.1 (LL) 40.0 - 54.0 %    MCV 72 (L) 82 - 98 fL    MCH 20.2 (L) 27.0 - 31.0 pg    MCHC 28.2 (L) 32.0 - 36.0 g/dL    RDW 19.1 (H) 11.5 - 14.5 %    Platelets 225 150 - 450 K/uL    MPV 10.0 9.2 - 12.9 fL    Immature Granulocytes 0.5 0.0 - 0.5 %    Gran # (ANC) 10.1 (H) 1.8 - 7.7 K/uL    Immature Grans (Abs) 0.06 (H) 0.00 - 0.04 K/uL    Lymph # 1.3 1.0 - 4.8 K/uL    Mono # 0.6 0.3 - 1.0 K/uL    Eos # 0.0 0.0 - 0.5 K/uL    Baso # 0.02 0.00 - 0.20 K/uL    nRBC 0 0 /100 WBC    Gran % 83.4 (H) 38.0 - 73.0 %    Lymph % 10.9 (L) 18.0 - 48.0 %    Mono % 4.8 4.0 - 15.0 %    Eosinophil % 0.2 0.0 - 8.0 %    Basophil % 0.2 0.0 - 1.9 %    Aniso Slight     Hypo Occasional     Tear Drop Cells Occasional     Differential Method Automated    Comprehensive metabolic panel   Result Value Ref Range    Sodium 145 136 - 145 mmol/L    Potassium 4.3  3.5 - 5.1 mmol/L    Chloride 116 (H) 95 - 110 mmol/L    CO2 20 (L) 23 - 29 mmol/L    Glucose 185 (H) 70 - 110 mg/dL    BUN 30 (H) 8 - 23 mg/dL    Creatinine 1.5 (H) 0.5 - 1.4 mg/dL    Calcium 8.9 8.7 - 10.5 mg/dL    Total Protein 5.3 (L) 6.0 - 8.4 g/dL    Albumin 3.0 (L) 3.5 - 5.2 g/dL    Total Bilirubin 0.3 0.1 - 1.0 mg/dL    Alkaline Phosphatase 64 55 - 135 U/L    AST 15 10 - 40 U/L    ALT 14 10 - 44 U/L    eGFR 48 (A) >60 mL/min/1.73 m^2    Anion Gap 9 8 - 16 mmol/L   Protime-INR   Result Value Ref Range    Prothrombin Time 12.7 (H) 9.0 - 12.5 sec    INR 1.1 0.8 - 1.2   Occult blood x 1, stool    Specimen: Stool   Result Value Ref Range    Occult Blood Positive (A) Negative   Hemoglobin A1c   Result Value Ref Range    Hemoglobin A1C 6.7 (H) 4.0 - 5.6 %    Estimated Avg Glucose 146 (H) 68 - 131 mg/dL   Urinalysis, Reflex to Urine Culture Urine, Clean Catch    Specimen: Urine   Result Value Ref Range    Specimen UA Urine, Clean Catch     Color, UA Colorless (A) Yellow, Straw, Nimisha    Appearance, UA Clear Clear    pH, UA 6.0 5.0 - 8.0    Specific Gravity, UA >1.030 (A) 1.005 - 1.030    Protein, UA Negative Negative    Glucose, UA 4+ (A) Negative    Ketones, UA Negative Negative    Bilirubin (UA) Negative Negative    Occult Blood UA Trace (A) Negative    Nitrite, UA Negative Negative    Urobilinogen, UA Negative <2.0 EU/dL    Leukocytes, UA 2+ (A) Negative   Urinalysis Microscopic   Result Value Ref Range    RBC, UA 2 0 - 4 /hpf    WBC, UA 55 (H) 0 - 5 /hpf    WBC Clumps, UA Many (A) None-Rare    Bacteria None None-Occ /hpf    Yeast, UA None None    Hyaline Casts, UA 1 0-1/lpf /lpf    Microscopic Comment SEE COMMENT    CBC Auto Differential   Result Value Ref Range    WBC 10.26 3.90 - 12.70 K/uL    RBC 2.94 (L) 4.60 - 6.20 M/uL    Hemoglobin 6.9 (L) 14.0 - 18.0 g/dL    Hematocrit 22.9 (L) 40.0 - 54.0 %    MCV 78 (L) 82 - 98 fL    MCH 23.5 (L) 27.0 - 31.0 pg    MCHC 30.1 (L) 32.0 - 36.0 g/dL    RDW 19.8 (H) 11.5 -  14.5 %    Platelets 174 150 - 450 K/uL    MPV 10.6 9.2 - 12.9 fL    Immature Granulocytes 0.3 0.0 - 0.5 %    Gran # (ANC) 7.9 (H) 1.8 - 7.7 K/uL    Immature Grans (Abs) 0.03 0.00 - 0.04 K/uL    Lymph # 1.5 1.0 - 4.8 K/uL    Mono # 0.8 0.3 - 1.0 K/uL    Eos # 0.0 0.0 - 0.5 K/uL    Baso # 0.02 0.00 - 0.20 K/uL    nRBC 0 0 /100 WBC    Gran % 77.0 (H) 38.0 - 73.0 %    Lymph % 14.5 (L) 18.0 - 48.0 %    Mono % 7.9 4.0 - 15.0 %    Eosinophil % 0.1 0.0 - 8.0 %    Basophil % 0.2 0.0 - 1.9 %    Differential Method Automated    Comprehensive metabolic panel   Result Value Ref Range    Sodium 144 136 - 145 mmol/L    Potassium 4.5 3.5 - 5.1 mmol/L    Chloride 118 (H) 95 - 110 mmol/L    CO2 18 (L) 23 - 29 mmol/L    Glucose 145 (H) 70 - 110 mg/dL    BUN 30 (H) 8 - 23 mg/dL    Creatinine 1.4 0.5 - 1.4 mg/dL    Calcium 8.8 8.7 - 10.5 mg/dL    Total Protein 5.1 (L) 6.0 - 8.4 g/dL    Albumin 3.0 (L) 3.5 - 5.2 g/dL    Total Bilirubin 0.4 0.1 - 1.0 mg/dL    Alkaline Phosphatase 60 55 - 135 U/L    AST 14 10 - 40 U/L    ALT 14 10 - 44 U/L    eGFR 52 (A) >60 mL/min/1.73 m^2    Anion Gap 8 8 - 16 mmol/L   Hemoglobin   Result Value Ref Range    Hemoglobin 6.5 (L) 14.0 - 18.0 g/dL   Hematocrit   Result Value Ref Range    Hematocrit 21.7 (L) 40.0 - 54.0 %   Hemoglobin   Result Value Ref Range    Hemoglobin 8.7 (L) 14.0 - 18.0 g/dL   Hematocrit   Result Value Ref Range    Hematocrit 27.5 (L) 40.0 - 54.0 %   CBC Auto Differential   Result Value Ref Range    WBC 7.35 3.90 - 12.70 K/uL    RBC 3.33 (L) 4.60 - 6.20 M/uL    Hemoglobin 8.2 (L) 14.0 - 18.0 g/dL    Hematocrit 26.9 (L) 40.0 - 54.0 %    MCV 81 (L) 82 - 98 fL    MCH 24.6 (L) 27.0 - 31.0 pg    MCHC 30.5 (L) 32.0 - 36.0 g/dL    RDW 19.9 (H) 11.5 - 14.5 %    Platelets 137 (L) 150 - 450 K/uL    MPV 10.5 9.2 - 12.9 fL    Immature Granulocytes 0.7 (H) 0.0 - 0.5 %    Gran # (ANC) 5.2 1.8 - 7.7 K/uL    Immature Grans (Abs) 0.05 (H) 0.00 - 0.04 K/uL    Lymph # 1.3 1.0 - 4.8 K/uL    Mono # 0.7 0.3 -  1.0 K/uL    Eos # 0.1 0.0 - 0.5 K/uL    Baso # 0.03 0.00 - 0.20 K/uL    nRBC 0 0 /100 WBC    Gran % 71.0 38.0 - 73.0 %    Lymph % 17.3 (L) 18.0 - 48.0 %    Mono % 9.5 4.0 - 15.0 %    Eosinophil % 1.1 0.0 - 8.0 %    Basophil % 0.4 0.0 - 1.9 %    Differential Method Automated    Hemoglobin   Result Value Ref Range    Hemoglobin 9.2 (L) 14.0 - 18.0 g/dL   EKG 12-lead   Result Value Ref Range    QRS Duration 88 ms    [facility] QTC Calculation 444 ms   Type & Screen   Result Value Ref Range    Group & Rh O POS     Indirect Tiffany NEG     Specimen Outdate 07/22/2024 23:59    POCT glucose   Result Value Ref Range    POCT Glucose 154 (H) 70 - 110 mg/dL   POCT glucose   Result Value Ref Range    POCT Glucose 139 (H) 70 - 110 mg/dL   POCT glucose   Result Value Ref Range    POCT Glucose 121 (H) 70 - 110 mg/dL   POCT glucose   Result Value Ref Range    POCT Glucose 130 (H) 70 - 110 mg/dL   POCT glucose   Result Value Ref Range    POCT Glucose 115 (H) 70 - 110 mg/dL   POCT glucose   Result Value Ref Range    POCT Glucose 100 70 - 110 mg/dL   POCT glucose   Result Value Ref Range    POCT Glucose 119 (H) 70 - 110 mg/dL   Prepare RBC 1 Unit   Result Value Ref Range    UNIT NUMBER [id number]     Product Code A4698T08     DISPENSE STATUS TRANSFUSED     CODING SYSTEM [id number]     Unit Blood Type Code 5100     Unit Blood Type O POS     Unit Expiration [id number]     CROSSMATCH INTERPRETATION Compatible    Prepare RBC 1 Unit   Result Value Ref Range    UNIT NUMBER [id number]     Product Code G4436A89     DISPENSE STATUS TRANSFUSED     CODING SYSTEM JDWL185     Unit Blood Type Code 5100     Unit Blood Type O POS     Unit Expiration [id number]     CROSSMATCH INTERPRETATION Compatible    Prepare RBC 2 Units; bleed   Result Value Ref Range    UNIT NUMBER [id number]     Product Code X8275A37     DISPENSE STATUS TRANSFUSED     CODING SYSTEM XUIW011     Unit Blood Type Code 5100     Unit Blood Type O POS     Unit Expiration [id number]      CROSSMATCH INTERPRETATION Compatible     UNIT NUMBER [id number]     Product Code K8114S65     DISPENSE STATUS TRANSFUSED     CODING SYSTEM STGD069     Unit Blood Type Code 5100     Unit Blood Type O POS     Unit Expiration [id number]     CROSSMATCH INTERPRETATION Compatible          Imaging Results:  Imaging Results              CTA Acute GI Supreme, Abdomen and Pelvis (Final result)  Result time 07/19/24 19:44:35      Final result by Alexis Howell MD (07/19/24 19:44:35)                   Impression:      Slightly hyperdense focus in the gastric fundus measuring 13 mm may relate to ingested material. Small gastric bleed not excluded. See for example series 2, image 40.    Small fat umbilical hernia    Colonic diverticulosis    Punctate nonobstructing left renal calculi    Recommend follow-up symptoms persist      Electronically signed by: lAexis Howell  Date:    07/19/2024  Time:    19:44               Narrative:    EXAMINATION:  CT angiography acute GI bleed abdomen pelvis    CLINICAL HISTORY:  GI bleeding    TECHNIQUE:  CT angiography of the abdomen pelvis with and without contrast.  3 minute delays were provided.  MIP imaging was utilized.  A total of 100 cc of Omnipaque 350 was injected.    COMPARISON:  None    FINDINGS:  Punctate nonobstructing left renal calculi. Chronic perinephric fat stranding. Atherosclerotic changes of the aorta iliac vasculature. Small fat containing umbilical hernia.  Bladder is mildly distended.  Normal appendix.  No bowel obstruction. Coronary artery calcification is identified. Mild subsegmental atelectasis.  Small pneumatoceles in the lung bases.  Spleen is grossly unremarkable.  Fatty infiltration of liver.  Left adrenal adenoma measures up to 17 mm.  Slightly hyperdense focus in the gastric fundus measuring 13 mm may relate to ingested material.  Small gastric bleed not excluded.  See for example series 2, image 40.                                       The EKG was  ordered, reviewed, and independently interpreted by the ED provider.  Interpretation time: 16:49  Rate: 99 BPM  Rhythm:  Sinus rhythm with 1st degree AV block   Interpretation: Anterolateral infarct, age undetermined. No STEMI.             The Emergency Provider reviewed the vital signs and test results, which are outlined above.     ED Discussion     5:58 PM: Discussed pt's case with Dr. Ramires (Gastroenterology) who recommends CTA given syncope and hemoglobin result .    8:14 PM: Discussed pt's case with Dr. Ramires (Gastroenterology) who recommends admit and will see pt in consult.    8:20 PM: Discussed case with Dr. Renae ([facility] Medicine). Dr. Renae agrees with current care and management of pt and accepts admission.   Admitting Service: [facility] medicine   Admitting Physician: Dr. Renae  Admit to: IP tele     8:21 PM: Re-evaluated pt. I have discussed test results, shared treatment plan, and the need for admission with patient and famil at bedside. Pt and family express understanding at this time and agree with all information. All questions answered. Pt and family have no further questions or concerns at this time. Pt is ready for admit.        ED Course as of 07/22/24 0347   Fri Jul 19, 2024   1720 Rhythm strip reviewed. Nsr, no stemi.  99bpm [LV]      ED Course User Index  [LV] Ja Rai Jr., MD     Medical Decision Making  Amount and/or Complexity of Data Reviewed  Labs: ordered. Decision-making details documented in ED Course.  Radiology: ordered and independent interpretation performed. Decision-making details documented in ED Course.  ECG/medicine tests: ordered and independent interpretation performed. Decision-making details documented in ED Course.    Risk  OTC drugs.  Prescription drug management.  Parenteral controlled substances.  Decision regarding hospitalization.  Risk Details: OTC drugs, prescription drugs and controlled substances considered.  Due to patient's symptoms improving and  pain controlled pain medications ordered appropriately.  Differential diagnoses:  CVA, infection, pneumonia, urinary tract infection, intra-abdominal pathology, renal failure, dehydration, electrolyte abnormality or metabolic cause y, drug affect, hyperglycemia hypoglycemia, drug drug interaction, psychiatric illness, meningitis encephalitis, seizure, vasculitis, heart failure, arrhythmia, endocarditis                  ED Medication(s):  Medications   sodium chloride 0.9% bolus 1,000 mL 1,000 mL (0 mLs Intravenous Stopped 7/19/24 1754)   iohexoL (OMNIPAQUE 350) injection 100 mL (100 mLs Intravenous Given 7/19/24 1917)   pantoprazole injection 80 mg (80 mg Intravenous Given 7/20/24 0145)       Discharge Medication List as of 7/21/2024  4:30 PM        START taking these medications    Details   famotidine (PEPCID) 20 MG tablet Take 1 tablet (20 mg total) by mouth 2 (two) times daily., Starting Sun 7/21/2024, Until Tue 8/20/2024, Normal      vancomycin 125 mg/5 mL Soln Take 5 mLs (125 mg total) by mouth every 6 (six) hours. for 13 days, Starting Sun 7/21/2024, Until Sat 8/3/2024, Normal              Follow-up Information       No, Primary Doctor Follow up in 3 day(s).    [facility] [place]                               Scribe Attestation:   Scribe #1: I performed the above scribed service and the documentation accurately describes the services I performed. I attest to the accuracy of the note.     Attending:   Physician Attestation Statement for Scribe #1: I, Ja Rai Jr., MD, personally performed the services described in this documentation, as scribed by Angelica Hines, in my presence, and it is both accurate and complete.           Clinical Impression       ICD-10-CM ICD-9-CM   1. Hematochezia  K92.1 578.1   2. Anemia  D64.9 285.9   3. Symptomatic anemia  D64.9 285.9   4. Chest pain  R07.9 786.50   5. Clostridium difficile infection  A49.8 041.84       Disposition:   Disposition:  Admitted  Condition: Serious         Ja Rai Jr., MD  07/22/24 9085

## 2024-07-19 NOTE — ED NOTES
Pt resting in ED stretcher comfortably, skin warm and dry, RR even and unlabored. LUIS ARMANDOS. NADN.

## 2024-07-19 NOTE — ED NOTES
Blood transfusion consent form signed and on chart. MD Cecelia consented  pt for transfusion. All questions answered at this time.

## 2024-07-20 PROBLEM — K92.1 HEMATOCHEZIA: Status: ACTIVE | Noted: 2024-07-20

## 2024-07-20 PROBLEM — A49.8 CLOSTRIDIUM DIFFICILE INFECTION: Status: ACTIVE | Noted: 2024-07-20

## 2024-07-20 LAB
ALBUMIN SERPL BCP-MCNC: 3 G/DL (ref 3.5–5.2)
ALP SERPL-CCNC: 60 U/L (ref 55–135)
ALT SERPL W/O P-5'-P-CCNC: 14 U/L (ref 10–44)
ANION GAP SERPL CALC-SCNC: 8 MMOL/L (ref 8–16)
AST SERPL-CCNC: 14 U/L (ref 10–40)
BACTERIA UR CULT: NORMAL
BACTERIA UR CULT: NORMAL
BASOPHILS # BLD AUTO: 0.02 K/UL (ref 0–0.2)
BASOPHILS NFR BLD: 0.2 % (ref 0–1.9)
BILIRUB SERPL-MCNC: 0.4 MG/DL (ref 0.1–1)
BLD PROD TYP BPU: NORMAL
BLD PROD TYP BPU: NORMAL
BLOOD UNIT EXPIRATION DATE: NORMAL
BLOOD UNIT EXPIRATION DATE: NORMAL
BLOOD UNIT TYPE CODE: 5100
BLOOD UNIT TYPE CODE: 5100
BLOOD UNIT TYPE: NORMAL
BLOOD UNIT TYPE: NORMAL
BUN SERPL-MCNC: 30 MG/DL (ref 8–23)
C DIFF GDH STL QL: POSITIVE
C DIFF TOX A+B STL QL IA: NEGATIVE
C DIFF TOX GENS STL QL NAA+PROBE: POSITIVE
CALCIUM SERPL-MCNC: 8.8 MG/DL (ref 8.7–10.5)
CHLORIDE SERPL-SCNC: 118 MMOL/L (ref 95–110)
CO2 SERPL-SCNC: 18 MMOL/L (ref 23–29)
CODING SYSTEM: NORMAL
CODING SYSTEM: NORMAL
CREAT SERPL-MCNC: 1.4 MG/DL (ref 0.5–1.4)
CROSSMATCH INTERPRETATION: NORMAL
CROSSMATCH INTERPRETATION: NORMAL
DIFFERENTIAL METHOD BLD: ABNORMAL
DISPENSE STATUS: NORMAL
DISPENSE STATUS: NORMAL
EOSINOPHIL # BLD AUTO: 0 K/UL (ref 0–0.5)
EOSINOPHIL NFR BLD: 0.1 % (ref 0–8)
ERYTHROCYTE [DISTWIDTH] IN BLOOD BY AUTOMATED COUNT: 19.8 % (ref 11.5–14.5)
EST. GFR  (NO RACE VARIABLE): 52 ML/MIN/1.73 M^2
ESTIMATED AVG GLUCOSE: 146 MG/DL (ref 68–131)
GLUCOSE SERPL-MCNC: 145 MG/DL (ref 70–110)
HBA1C MFR BLD: 6.7 % (ref 4–5.6)
HCT VFR BLD AUTO: 21.7 % (ref 40–54)
HCT VFR BLD AUTO: 22.9 % (ref 40–54)
HCT VFR BLD AUTO: 27.5 % (ref 40–54)
HGB BLD-MCNC: 6.5 G/DL (ref 14–18)
HGB BLD-MCNC: 6.9 G/DL (ref 14–18)
HGB BLD-MCNC: 8.7 G/DL (ref 14–18)
IMM GRANULOCYTES # BLD AUTO: 0.03 K/UL (ref 0–0.04)
IMM GRANULOCYTES NFR BLD AUTO: 0.3 % (ref 0–0.5)
LYMPHOCYTES # BLD AUTO: 1.5 K/UL (ref 1–4.8)
LYMPHOCYTES NFR BLD: 14.5 % (ref 18–48)
MCH RBC QN AUTO: 23.5 PG (ref 27–31)
MCHC RBC AUTO-ENTMCNC: 30.1 G/DL (ref 32–36)
MCV RBC AUTO: 78 FL (ref 82–98)
MONOCYTES # BLD AUTO: 0.8 K/UL (ref 0.3–1)
MONOCYTES NFR BLD: 7.9 % (ref 4–15)
NEUTROPHILS # BLD AUTO: 7.9 K/UL (ref 1.8–7.7)
NEUTROPHILS NFR BLD: 77 % (ref 38–73)
NRBC BLD-RTO: 0 /100 WBC
NUM UNITS TRANS PACKED RBC: NORMAL
NUM UNITS TRANS PACKED RBC: NORMAL
PLATELET # BLD AUTO: 174 K/UL (ref 150–450)
PMV BLD AUTO: 10.6 FL (ref 9.2–12.9)
POCT GLUCOSE: 115 MG/DL (ref 70–110)
POCT GLUCOSE: 121 MG/DL (ref 70–110)
POCT GLUCOSE: 130 MG/DL (ref 70–110)
POCT GLUCOSE: 139 MG/DL (ref 70–110)
POTASSIUM SERPL-SCNC: 4.5 MMOL/L (ref 3.5–5.1)
PROT SERPL-MCNC: 5.1 G/DL (ref 6–8.4)
RBC # BLD AUTO: 2.94 M/UL (ref 4.6–6.2)
SODIUM SERPL-SCNC: 144 MMOL/L (ref 136–145)
WBC # BLD AUTO: 10.26 K/UL (ref 3.9–12.7)

## 2024-07-20 PROCEDURE — 27000207 HC ISOLATION

## 2024-07-20 PROCEDURE — 87493 C DIFF AMPLIFIED PROBE: CPT | Performed by: INTERNAL MEDICINE

## 2024-07-20 PROCEDURE — 85014 HEMATOCRIT: CPT | Performed by: INTERNAL MEDICINE

## 2024-07-20 PROCEDURE — 36430 TRANSFUSION BLD/BLD COMPNT: CPT

## 2024-07-20 PROCEDURE — 85025 COMPLETE CBC W/AUTO DIFF WBC: CPT | Performed by: INTERNAL MEDICINE

## 2024-07-20 PROCEDURE — 36415 COLL VENOUS BLD VENIPUNCTURE: CPT | Mod: XB | Performed by: INTERNAL MEDICINE

## 2024-07-20 PROCEDURE — 21400001 HC TELEMETRY ROOM

## 2024-07-20 PROCEDURE — 85018 HEMOGLOBIN: CPT | Performed by: INTERNAL MEDICINE

## 2024-07-20 PROCEDURE — 83036 HEMOGLOBIN GLYCOSYLATED A1C: CPT | Performed by: INTERNAL MEDICINE

## 2024-07-20 PROCEDURE — 99223 1ST HOSP IP/OBS HIGH 75: CPT | Mod: ,,, | Performed by: INTERNAL MEDICINE

## 2024-07-20 PROCEDURE — 25000003 PHARM REV CODE 250: Performed by: INTERNAL MEDICINE

## 2024-07-20 PROCEDURE — 63600175 PHARM REV CODE 636 W HCPCS: Performed by: INTERNAL MEDICINE

## 2024-07-20 PROCEDURE — 87324 CLOSTRIDIUM AG IA: CPT | Performed by: INTERNAL MEDICINE

## 2024-07-20 PROCEDURE — P9016 RBC LEUKOCYTES REDUCED: HCPCS | Performed by: INTERNAL MEDICINE

## 2024-07-20 PROCEDURE — 80053 COMPREHEN METABOLIC PANEL: CPT | Performed by: INTERNAL MEDICINE

## 2024-07-20 PROCEDURE — 86920 COMPATIBILITY TEST SPIN: CPT | Performed by: INTERNAL MEDICINE

## 2024-07-20 RX ORDER — FAMOTIDINE 20 MG/1
20 TABLET, FILM COATED ORAL 2 TIMES DAILY
Status: DISCONTINUED | OUTPATIENT
Start: 2024-07-20 | End: 2024-07-21 | Stop reason: HOSPADM

## 2024-07-20 RX ORDER — HYDROCODONE BITARTRATE AND ACETAMINOPHEN 500; 5 MG/1; MG/1
TABLET ORAL
Status: DISCONTINUED | OUTPATIENT
Start: 2024-07-20 | End: 2024-07-21 | Stop reason: HOSPADM

## 2024-07-20 RX ORDER — POLYETHYLENE GLYCOL 3350, SODIUM SULFATE ANHYDROUS, SODIUM BICARBONATE, SODIUM CHLORIDE, POTASSIUM CHLORIDE 236; 22.74; 6.74; 5.86; 2.97 G/4L; G/4L; G/4L; G/4L; G/4L
4000 POWDER, FOR SOLUTION ORAL ONCE
Status: DISCONTINUED | OUTPATIENT
Start: 2024-07-20 | End: 2024-07-20

## 2024-07-20 RX ADMIN — PANTOPRAZOLE SODIUM 80 MG: 40 INJECTION, POWDER, FOR SOLUTION INTRAVENOUS at 01:07

## 2024-07-20 RX ADMIN — FAMOTIDINE 20 MG: 20 TABLET ORAL at 09:07

## 2024-07-20 RX ADMIN — PANTOPRAZOLE SODIUM 40 MG: 40 INJECTION, POWDER, FOR SOLUTION INTRAVENOUS at 08:07

## 2024-07-20 RX ADMIN — VANCOMYCIN HYDROCHLORIDE 125 MG: KIT at 05:07

## 2024-07-20 RX ADMIN — SODIUM CHLORIDE: 9 INJECTION, SOLUTION INTRAVENOUS at 09:07

## 2024-07-20 NOTE — SUBJECTIVE & OBJECTIVE
Interval History:  Patient seen and examined bedside.  States he is feeling much better.  No longer lightheaded dizzy.  No further diarrhea.    Review of Systems   Constitutional:  Positive for activity change. Negative for chills and fever.   Respiratory:  Negative for cough and shortness of breath.    Cardiovascular:  Negative for chest pain, palpitations and leg swelling.   Gastrointestinal:  Positive for blood in stool and diarrhea. Negative for abdominal pain, nausea and vomiting.   Genitourinary:  Negative for difficulty urinating, dysuria and hematuria.   Neurological:  Positive for syncope.   All other systems reviewed and are negative.    Objective:     Vital Signs (Most Recent):  Temp: 98.4 °F (36.9 °C) (07/20/24 1157)  Pulse: 83 (07/20/24 1157)  Resp: 18 (07/20/24 1157)  BP: 124/69 (07/20/24 1157)  SpO2: 98 % (07/20/24 1157) Vital Signs (24h Range):  Temp:  [98 °F (36.7 °C)-99 °F (37.2 °C)] 98.4 °F (36.9 °C)  Pulse:  [] 83  Resp:  [16-18] 18  SpO2:  [98 %-100 %] 98 %  BP: ()/(45-94) 124/69     Weight: 111.1 kg (245 lb)  Body mass index is 35.15 kg/m².    Intake/Output Summary (Last 24 hours) at 7/20/2024 1446  Last data filed at 7/20/2024 0635  Gross per 24 hour   Intake 1464 ml   Output 950 ml   Net 514 ml         Physical Exam  Vitals reviewed.   Constitutional:       General: He is not in acute distress.     Appearance: He is obese. He is not ill-appearing or diaphoretic.   HENT:      Nose: Nose normal.      Mouth/Throat:      Mouth: Mucous membranes are moist.      Pharynx: Oropharynx is clear.   Eyes:      Conjunctiva/sclera: Conjunctivae normal.   Cardiovascular:      Rate and Rhythm: Normal rate and regular rhythm.   Pulmonary:      Effort: Pulmonary effort is normal. No respiratory distress.   Abdominal:      General: There is no distension.      Tenderness: There is no abdominal tenderness.   Musculoskeletal:         General: No swelling.   Skin:     General: Skin is warm and dry.    Neurological:      Mental Status: He is alert and oriented to person, place, and time.   Psychiatric:         Mood and Affect: Mood normal.         Behavior: Behavior normal.           C diff antigen positive, toxin negative, PCR positive  Significant Labs: All pertinent labs within the past 24 hours have been reviewed.  C diff PCR positive  CBC:   Recent Labs   Lab 07/19/24  1649 07/20/24  0212 07/20/24  0549   WBC 12.09 10.26  --    HGB 5.1* 6.9* 6.5*   HCT 18.1* 22.9* 21.7*    174  --      CMP:   Recent Labs   Lab 07/19/24  1649 07/20/24  0212    144   K 4.3 4.5   * 118*   CO2 20* 18*   * 145*   BUN 30* 30*   CREATININE 1.5* 1.4   CALCIUM 8.9 8.8   PROT 5.3* 5.1*   ALBUMIN 3.0* 3.0*   BILITOT 0.3 0.4   ALKPHOS 64 60   AST 15 14   ALT 14 14   ANIONGAP 9 8       Significant Imaging: I have reviewed all pertinent imaging results/findings within the past 24 hours.

## 2024-07-20 NOTE — ASSESSMENT & PLAN NOTE
"Patient's FSGs are uncontrolled due to hyperglycemia on current medication regimen.  Last A1c reviewed- No results found for: "LABA1C", "HGBA1C"  Most recent fingerstick glucose reviewed- No results for input(s): "POCTGLUCOSE" in the last 24 hours.  Current correctional scale  Low  Maintain anti-hyperglycemic dose as follows-   Antihyperglycemics (From admission, onward)      Start     Stop Route Frequency Ordered    07/19/24 3739  insulin aspart U-100 pen 0-5 Units         -- SubQ Before meals & nightly PRN 07/19/24 2100          Hold Oral hypoglycemics while patient is in the hospital.  Hold Jardiance.  Check A1c.  "

## 2024-07-20 NOTE — HPI
Mr Newton is 78-year-old  man with history of non-insulin-dependent diabetes mellitus type 2, hypertension, hyperlipidemia, gout, BPH that presented to ED for anemia.   Patient had labs done yesterday and it revealed he was anemic.   PCP told him to go to the emergency room for transfusion if he develops symptoms.   He endorses a syncopal episode yesterday.   He states that he has been experience bright red blood per rectum intermittently since Wednesday.   He has not had any more bloody bowel movements since being admitted to the hospital.   Had a colonoscopy two years ago that was normal.   Denies nausea, vomiting, hematemesis,melena or abdominal pain.   Denies family history of colon cancer.   Patient is not on anticoagulation or antiplatelets.   H/H on admission was 5.1/18.7 (13.7/40.7 seven years ago).   CTA revealed slightly hyperdense focus in the gastric fundus measuring 13 mm may relate to ingested material. Small gastric bleed not excluded.

## 2024-07-20 NOTE — ASSESSMENT & PLAN NOTE
Patient's anemia is currently worsening. Has received 2 units of PRBCs on 07/19/2024 . Etiology likely d/t acute blood loss which was from presumed lower GI bleed  Current CBC reviewed-   Lab Results   Component Value Date    HGB 5.1 (LL) 07/19/2024    HCT 18.1 (LL) 07/19/2024     Monitor serial CBC and transfuse if patient becomes hemodynamically unstable, symptomatic or H/H drops below 7/21.

## 2024-07-20 NOTE — PLAN OF CARE
Remains injury free. Denies c/o pain. Received 2 units PRBC this shift. Tolerated well. Regular diet. Cardiac monitoring in progress. No s/s bleeding this shift. No s/s acute idstress.

## 2024-07-20 NOTE — ASSESSMENT & PLAN NOTE
-white blood cell count 12.09, occult blood positive, sodium 145, potassium 4.3, creatinine 1.5, LFTs unremarkable, afebrile  -continue IV fluids  -Cdiff +, toxin negative, PCR +  Will start po vanco

## 2024-07-20 NOTE — ASSESSMENT & PLAN NOTE
-white blood cell count 12.09, occult blood positive, sodium 145, potassium 4.3, creatinine 1.5, LFTs unremarkable, afebrile  -will check stool for C diff as patient was recently on Augmentin  -check urinalysis with reflex culture  -continue IV fluids

## 2024-07-20 NOTE — ASSESSMENT & PLAN NOTE
C diff PCR and antigen positive toxin negative  Initiate vancomycin 125mg  q.6 hours  Patient will need to follow up with GI after C diff cleared for colonoscopy

## 2024-07-20 NOTE — SUBJECTIVE & OBJECTIVE
Past Medical History:   Diagnosis Date    Diabetes mellitus     Gout     Hyperlipidemia     Hypertension        No past surgical history on file.    Review of patient's allergies indicates:  No Known Allergies  Family History    None       Tobacco Use    Smoking status: Former    Smokeless tobacco: Not on file   Substance and Sexual Activity    Alcohol use: Yes    Drug use: No    Sexual activity: Yes     Review of Systems   Constitutional:  Positive for activity change.   Gastrointestinal:  Positive for blood in stool. Negative for abdominal distention, abdominal pain, diarrhea, nausea, rectal pain and vomiting.     Objective:     Vital Signs (Most Recent):  Temp: 98.4 °F (36.9 °C) (07/20/24 1157)  Pulse: 83 (07/20/24 1157)  Resp: 18 (07/20/24 1157)  BP: 124/69 (07/20/24 1157)  SpO2: 98 % (07/20/24 1157) Vital Signs (24h Range):  Temp:  [98 °F (36.7 °C)-99 °F (37.2 °C)] 98.4 °F (36.9 °C)  Pulse:  [] 83  Resp:  [16-18] 18  SpO2:  [98 %-100 %] 98 %  BP: ()/(45-94) 124/69     Weight: 111.1 kg (245 lb) (07/20/24 0419)  Body mass index is 35.15 kg/m².      Intake/Output Summary (Last 24 hours) at 7/20/2024 1349  Last data filed at 7/20/2024 0635  Gross per 24 hour   Intake 1464 ml   Output 950 ml   Net 514 ml       Lines/Drains/Airways       Peripheral Intravenous Line  Duration                  Peripheral IV - Single Lumen 07/19/24 20 G Right Antecubital 1 day         Peripheral IV - Single Lumen 07/19/24 1635 18 G Anterior;Left;Proximal Forearm <1 day                     Physical Exam  Constitutional:       Appearance: Normal appearance.   HENT:      Head: Normocephalic.   Eyes:      Conjunctiva/sclera: Conjunctivae normal.   Pulmonary:      Effort: Pulmonary effort is normal.   Abdominal:      General: There is no distension.      Palpations: Abdomen is soft.      Tenderness: There is no abdominal tenderness. There is no guarding.   Neurological:      Mental Status: He is alert.          Significant  Labs:  CBC:   Recent Labs   Lab 07/19/24  1649 07/20/24  0212 07/20/24  0549   WBC 12.09 10.26  --    HGB 5.1* 6.9* 6.5*   HCT 18.1* 22.9* 21.7*    174  --      CMP:   Recent Labs   Lab 07/20/24  0212   *   CALCIUM 8.8   ALBUMIN 3.0*   PROT 5.1*      K 4.5   CO2 18*   *   BUN 30*   CREATININE 1.4   ALKPHOS 60   ALT 14   AST 14   BILITOT 0.4     Coagulation:   Recent Labs   Lab 07/19/24  1649   INR 1.1       Significant Imaging:  Imaging results within the past 24 hours have been reviewed.

## 2024-07-20 NOTE — H&P
[facility] - Emergency Dept.  [facility] Medicine  History & Physical    Patient Name: Mahin Newton  MRN: [id number]  Patient Class: IP- Inpatient  Admission Date: 7/19/2024  Attending Physician:  Kimber Renae MD  Primary Care Provider: [facility]         Patient information was obtained from patient, spouse/SO, ER records, and ER physician .     Subjective:     Principal Problem:Lower GI bleed    Chief Complaint:   Chief Complaint   Patient presents with    Loss of Consciousness     Syncopal episode at home. Orthostatic positive. Mucous membranes pale         HPI: 78-year-old  man with history of non-insulin-dependent diabetes mellitus type 2, hypertension, hyperlipidemia, gout, BPH, and obesity who was [facility] due to abnormal blood work with anemia requiring transfusion.  Patient reports that he has had bright red blood per rectum intermittently over the last day, 6 episodes, moderate volume, no associated abdominal pain, and no bleeding elsewhere.  He does report diarrhea intermixed with the bleeding.  Patient had a colonoscopy approximately 1-2 years ago that was normal by his report.  Last colonoscopy in our system was 08/14/2017 with cecal polyp (tubular adenoma) and rectal polyp (inflammatory hyperplastic polyp).  Patient is on aspirin 81 mg daily and diclofenac.  He has on no other NSAIDs, antiplatelet therapy, or anticoagulation.  Patient reports that he was in bed and was getting up to go to the bathroom when he became dizzy and passed out.  He sustained no injuries.  He did lose consciousness for about 15 seconds per his wife who witnessed episode.  Orthostatic vital signs are positive in our emergency department.  Hemoglobin is 5.1.    Patient had recent outpatient urology appointment 06/24/2024 with urinalysis ordered.  Urine culture with Streptococcus agalactiae group B. Patient was prescribed Augmentin by his urologist on 06/27/2024 which he has completed.    Past  Medical History:   Diagnosis Date    Diabetes mellitus     Gout     Hyperlipidemia     Hypertension        No past surgical history on file.    Review of patient's allergies indicates:  No Known Allergies    No current facility-administered medications on file prior to encounter.     Current Outpatient Medications on File Prior to Encounter   Medication Sig    allopurinol (ZYLOPRIM) 300 MG tablet Take 300 mg by mouth once daily.    amLODIPine (NORVASC) 10 MG tablet Take 10 mg by mouth once daily.    atorvastatin (LIPITOR) 80 MG tablet Take 80 mg by mouth once daily.    carboxymethylcellulose (REFRESH PLUS) 0.5 % Dpet Place 0.4 drops into both eyes 3 (three) times daily as needed.    cholecalciferol, vitamin D3, (VITAMIN D3) 400 unit Chew Take by mouth. Take two tablets by mouth daily    cyanocobalamin (VITAMIN B-12) 1000 MCG tablet Take 100 mcg by mouth once daily.    empagliflozin (JARDIANCE) 25 mg tablet Take 25 mg by mouth once daily.    lisinopriL (PRINIVIL,ZESTRIL) 40 MG tablet Take 40 mg by mouth once daily.    multivitamin with minerals tablet Take 1 tablet by mouth once daily.    sertraline (ZOLOFT) 100 MG tablet Take 100 mg by mouth once daily.    tamsulosin (FLOMAX) 0.4 mg Cap Take 1 capsule (0.4 mg total) by mouth once daily.    [DISCONTINUED] lisinopril-hydrochlorothiazide (PRINZIDE,ZESTORETIC) 20-12.5 mg per tablet Take 1 tablet by mouth once daily.     Family History    None       Tobacco Use    Smoking status: Former    Smokeless tobacco: Not on file   Substance and Sexual Activity    Alcohol use: Yes    Drug use: No    Sexual activity: Yes     Review of Systems   Constitutional:  Negative for chills and fever.   Gastrointestinal:  Positive for blood in stool and diarrhea. Negative for abdominal pain, nausea and vomiting.   Genitourinary:  Negative for difficulty urinating, dysuria and hematuria.   Neurological:  Positive for syncope.   All other systems reviewed and are negative.    Objective:      Vital Signs (Most Recent):  Temp: 98.2 °F (36.8 °C) (07/19/24 1915)  Pulse: 98 (07/19/24 1915)  Resp: 18 (07/19/24 1915)  BP: 119/64 (07/19/24 1915)  SpO2: 100 % (07/19/24 1915) Vital Signs (24h Range):  Temp:  [98.2 °F (36.8 °C)-99 °F (37.2 °C)] 98.2 °F (36.8 °C)  Pulse:  [] 98  Resp:  [16-18] 18  SpO2:  [100 %] 100 %  BP: ()/(45-94) 119/64     Weight: 111.1 kg (245 lb)  Body mass index is 35.15 kg/m².     Physical Exam  Vitals reviewed.   Constitutional:       General: He is not in acute distress.     Appearance: He is obese. He is not ill-appearing or diaphoretic.   HENT:      Nose: Nose normal.   Eyes:      Conjunctiva/sclera: Conjunctivae normal.   Cardiovascular:      Rate and Rhythm: Normal rate.   Pulmonary:      Effort: Pulmonary effort is normal. No respiratory distress.   Abdominal:      General: There is no distension.      Tenderness: There is no abdominal tenderness.   Musculoskeletal:         General: No swelling.   Skin:     General: Skin is warm and dry.   Neurological:      Mental Status: He is alert and oriented to person, place, and time.   Psychiatric:         Mood and Affect: Mood normal.         Behavior: Behavior normal.                Significant Labs: All pertinent labs within the past 24 hours have been reviewed.  Recent Lab Results         07/19/24  1650   07/19/24  1649        Unit Blood Type Code   5100  [P]          5100  [P]       Unit Expiration   [id number]  [P]          [id number]  [P]       Unit Blood Type   O POS  [P]          O POS  [P]       Albumin   3.0       ALP   64       ALT   14       Anion Gap   9       Aniso   Slight       AST   15       Baso #   0.02       Basophil %   0.2       BILIRUBIN TOTAL   0.3  Comment: For infants and newborns, interpretation of results should be based  on gestational age, weight and in agreement with clinical  observations.    Premature Infant recommended reference ranges:  Up to 24 hours.............<8.0 mg/dL  Up to 48  hours............<12.0 mg/dL  3-5 days..................<15.0 mg/dL  6-29 days.................<15.0 mg/dL         BUN   30       Calcium   8.9       Chloride   116       CO2   20       CODING SYSTEM   [id number]  [P]          [id number]  [P]       Creatinine   1.5       Crossmatch Interpretation   Compatible  [P]          Compatible  [P]       Differential Method   Automated       DISPENSE STATUS   CROSSMATCHED  [P]          ISSUED  [P]       eGFR   48       Eos #   0.0       Eos %   0.2       Glucose   185       Gran # (ANC)   10.1       Gran %   83.4       Group & Rh   O POS       Hematocrit   18.1  Comment: hgb and hct critical result(s) called and verbal readback obtained   from [facility]  by [facility] 07/19/2024 17:01         Hemoglobin   5.1  Comment: hgb and hct critical result(s) called and verbal readback obtained   from [facility]  by [facility] 07/19/2024 17:01         Hypo   Occasional       Immature Grans (Abs)   0.06  Comment: Mild elevation in immature granulocytes is non specific and   can be seen in a variety of conditions including stress response,   acute inflammation, trauma and pregnancy. Correlation with other   laboratory and clinical findings is essential.         Immature Granulocytes   0.5       INDIRECT YAMINI   NEG       INR   1.1  Comment: Coumadin Therapy:  2.0 - 3.0 for INR for all indicators except mechanical heart valves  and antiphospholipid syndromes which should use 2.5 - 3.5.         Lymph #   1.3       Lymph %   10.9       MCH   20.2       MCHC   28.2       MCV   72       Mono #   0.6       Mono %   4.8       MPV   10.0       nRBC   0       Occult Blood Positive         Platelet Count   225       Potassium   4.3       Product Code   [id number]  [P]          [id number]  [P]       PROTEIN TOTAL   5.3       PT   12.7       RBC   2.52       RDW   19.1       Sodium   145       Specimen Outdate   07/22/2024 23:59       Teardrop Cells   Occasional       UNIT NUMBER   [id number]  [P]     "      [id number]  [P]       WBC   12.09                [P] - Preliminary Result               Significant Imaging: I have reviewed all pertinent imaging results/findings within the past 24 hours.  CTA Acute GI [place], Abdomen and Pelvis   Final Result      Slightly hyperdense focus in the gastric fundus measuring 13 mm may relate to ingested material. Small gastric bleed not excluded. See for example series 2, image 40.      Small fat umbilical hernia      Colonic diverticulosis      Punctate nonobstructing left renal calculi      Recommend follow-up symptoms persist         Electronically signed by: Alexis Howell   Date:    07/19/2024   Time:    19:44         Assessment/Plan:     * Lower GI bleed  Patient has acute blood loss due to hemorrhage, the hemorrhage is due to gastrointestinal bleed, patient does have a propensity for bleeding due to a medication, the medication is ASA and diclofenac.. Will trend hemoglobin/hematocrit Every 6 hours x4, as well as monitor and correct for any coagulation defects. CBC and vital signs have been reviewed and last CBC was noted-   Lab Results   Component Value Date    WBC 12.09 07/19/2024    HGB 5.1 (LL) 07/19/2024    HCT 18.1 (LL) 07/19/2024    MCV 72 (L) 07/19/2024     07/19/2024         Will order a type and screen and consent patient for blood transfusion. Will transfuse if Hgb is <7g/dl (<8g/dl in cases of active ACS) or if patient has rapid bleeding leading to hemodynamic instability.  -presumed lower GI bleed (diverticular) with bright red blood per rectum however CTA unable to completely exclude small gastric bleed.  -CTA acute GI bleed with Slightly hyperdense focus in the gastric fundus measuring 13 mm may relate to ingested material. Small gastric bleed not excluded. Small fat umbilical hernia. Colonic diverticulosis. Punctate nonobstructing left renal calculi."   -INR 1.1, occult blood positive  -previous hemoglobin was 13.2 in February 2017 with no interim " labs to compare with  -NPO, IV fluids, and transfuse 2 units packed red blood cells  -check H&H Q 6 hours x4 followed by CBC daily  -consult GI  -Protonix 80 mg IV x1 dose followed by Protonix 40 mg IV b.i.d.  -hold aspirin and NSAIDs  -cardiac monitoring    Symptomatic anemia  Patient's anemia is currently worsening. Has received 2 units of PRBCs on 07/19/2024 . Etiology likely d/t acute blood loss which was from presumed lower GI bleed  Current CBC reviewed-   Lab Results   Component Value Date    HGB 5.1 (LL) 07/19/2024    HCT 18.1 (LL) 07/19/2024     Monitor serial CBC and transfuse if patient becomes hemodynamically unstable, symptomatic or H/H drops below 7/21.    Syncope  Syncope due to orthostatic hypotension related to acute GI bleed and symptomatic anemia requiring transfusion.  -transfuse 2 units packed red blood cells  -status post 1 L normal saline IV fluid bolus in the emergency department  -continue normal saline at 100 mL/hr  -hold all antihypertensives  -fall precautions      Hypotension  Hypotension related to severe anemia requiring transfusion  -transfuse packed red blood cells and give IV fluids  -hold all antihypertensives  -continue to monitor closely      Primary hypertension  Chronic, controlled.  Hypotensive on arrival due to GI bleed.  Latest blood pressure and vitals reviewed-     Temp:  [98.2 °F (36.8 °C)-99 °F (37.2 °C)]   Pulse:  []   Resp:  [16-18]   BP: ()/(45-94)   SpO2:  [100 %] .   Home meds for hypertension were reviewed and noted below.   Hypertension Medications               amLODIPine (NORVASC) 10 MG tablet Take 10 mg by mouth once daily.    lisinopriL (PRINIVIL,ZESTRIL) 40 MG tablet Take 40 mg by mouth once daily.            While in the hospital, will manage blood pressure as follows; Adjust home antihypertensive regimen as follows- hold Norvasc and lisinopril.  P.r.n. IV hydralazine with parameters.    Will utilize p.r.n. blood pressure medication only if  "patient's blood pressure greater than 160/100 and he develops symptoms such as worsening chest pain or shortness of breath.    Renal insufficiency  Current creatinine 1.5.  Last labs in February 2017 with creatinine 1.2 with no interim labs to compare with.  -recent UTI with urine culture 06/24/2024 with Streptococcus agalactiae group B treated with Augmentin  -repeat urinalysis with reflex culture  -continue IV fluids  -hold lisinopril  -check a.m. labs      Diarrhea  -white blood cell count 12.09, occult blood positive, sodium 145, potassium 4.3, creatinine 1.5, LFTs unremarkable, afebrile  -will check stool for C diff as patient was recently on Augmentin  -check urinalysis with reflex culture  -continue IV fluids      H/O: gout  Hold allopurinol      BPH (benign prostatic hyperplasia)  Hold Flomax      Hyperlipidemia  Hold Lipitor      Diabetes mellitus type 2, noninsulin dependent  Patient's FSGs are uncontrolled due to hyperglycemia on current medication regimen.  Last A1c reviewed- No results found for: "LABA1C", "HGBA1C"  Most recent fingerstick glucose reviewed- No results for input(s): "POCTGLUCOSE" in the last 24 hours.  Current correctional scale  Low  Maintain anti-hyperglycemic dose as follows-   Antihyperglycemics (From admission, onward)      Start     Stop Route Frequency Ordered    07/19/24 2159  insulin aspart U-100 pen 0-5 Units         -- SubQ Before meals & nightly PRN 07/19/24 2100          Hold Oral hypoglycemics while patient is in the hospital.  Hold Jardiance.  Check A1c.      VTE Risk Mitigation (From admission, onward)           Ordered     Reason for No Pharmacological VTE Prophylaxis  Once        Question:  Reasons:  Answer:  Active Bleeding    07/19/24 2100     IP VTE HIGH RISK PATIENT  Once         07/19/24 2100     Place sequential compression device  Until discontinued         07/19/24 2100                                    Kimber Renae MD  Department of Hospital " Medicine  [facility] - Emergency Dept.

## 2024-07-20 NOTE — PROGRESS NOTES
[facility] Medicine  Progress Note    Patient Name: Mahin Newton  MRN: [id number]  Patient Class: IP- Inpatient   Admission Date: 7/19/2024  Length of Stay: 1 days  Attending Physician: Faith Venegas MD  Primary Care Provider: Miranda, Primary Doctor        Subjective:     Principal Problem:Lower GI bleed        HPI:  78-year-old  man with history of non-insulin-dependent diabetes mellitus type 2, hypertension, hyperlipidemia, gout, BPH, and obesity who was [facility] due to abnormal blood work with anemia requiring transfusion.  Patient reports that he has had bright red blood per rectum intermittently over the last day, 6 episodes, moderate volume, no associated abdominal pain, and no bleeding elsewhere.  He does report diarrhea intermixed with the bleeding.  Patient had a colonoscopy approximately 1-2 years ago that was normal by his report.  Last colonoscopy in our system was 08/14/2017 with cecal polyp (tubular adenoma) and rectal polyp (inflammatory hyperplastic polyp).  Patient is on aspirin 81 mg daily and diclofenac.  He has on no other NSAIDs, antiplatelet therapy, or anticoagulation.  Patient reports that he was in bed and was getting up to go to the bathroom when he became dizzy and passed out.  He sustained no injuries.  He did lose consciousness for about 15 seconds per his wife who witnessed episode.  Orthostatic vital signs are positive in our emergency department.  Hemoglobin is 5.1.    Patient had recent outpatient urology appointment 06/24/2024 with urinalysis ordered.  Urine culture with Streptococcus agalactiae group B. Patient was prescribed Augmentin by his urologist on 06/27/2024 which he has completed.    Overview/Hospital Course:  Patient is a 71-year-old male with a history of diabetes, hypertension, hyperlipidemia, gout, BPH who was [facility] after routine blood work showed a hemoglobin of 5.9.  While patient was  attempting to get up out of the bed he had a syncopal episode; his wife called EMS and he presented.  He noted the past few days to have diarrhea with no associated abdominal pain but bright red blood per rectum.  He reports he had colonoscopy 1-2 years ago which was normal.  Patient's hemoglobin emergency department was 5.1.  He was admitted transfused 2 units of packed blood cells.  He had CTA performed which revealed hyperdense focus in the gastric fundus measuring 13 mm.  GI was consulted with plans to scope on 07/21.    Interval History:  Patient seen and examined bedside.  States he is feeling much better.  No longer lightheaded dizzy.  No further diarrhea.    Review of Systems   Constitutional:  Positive for activity change. Negative for chills and fever.   Respiratory:  Negative for cough and shortness of breath.    Cardiovascular:  Negative for chest pain, palpitations and leg swelling.   Gastrointestinal:  Positive for blood in stool and diarrhea. Negative for abdominal pain, nausea and vomiting.   Genitourinary:  Negative for difficulty urinating, dysuria and hematuria.   Neurological:  Positive for syncope.   All other systems reviewed and are negative.    Objective:     Vital Signs (Most Recent):  Temp: 98.4 °F (36.9 °C) (07/20/24 1157)  Pulse: 83 (07/20/24 1157)  Resp: 18 (07/20/24 1157)  BP: 124/69 (07/20/24 1157)  SpO2: 98 % (07/20/24 1157) Vital Signs (24h Range):  Temp:  [98 °F (36.7 °C)-99 °F (37.2 °C)] 98.4 °F (36.9 °C)  Pulse:  [] 83  Resp:  [16-18] 18  SpO2:  [98 %-100 %] 98 %  BP: ()/(45-94) 124/69     Weight: 111.1 kg (245 lb)  Body mass index is 35.15 kg/m².    Intake/Output Summary (Last 24 hours) at 7/20/2024 1446  Last data filed at 7/20/2024 0635  Gross per 24 hour   Intake 1464 ml   Output 950 ml   Net 514 ml         Physical Exam  Vitals reviewed.   Constitutional:       General: He is not in acute distress.     Appearance: He is obese. He is not ill-appearing or  "diaphoretic.   HENT:      Nose: Nose normal.      Mouth/Throat:      Mouth: Mucous membranes are moist.      Pharynx: Oropharynx is clear.   Eyes:      Conjunctiva/sclera: Conjunctivae normal.   Cardiovascular:      Rate and Rhythm: Normal rate and regular rhythm.   Pulmonary:      Effort: Pulmonary effort is normal. No respiratory distress.   Abdominal:      General: There is no distension.      Tenderness: There is no abdominal tenderness.   Musculoskeletal:         General: No swelling.   Skin:     General: Skin is warm and dry.   Neurological:      Mental Status: He is alert and oriented to person, place, and time.   Psychiatric:         Mood and Affect: Mood normal.         Behavior: Behavior normal.           C diff antigen positive, toxin negative, PCR positive  Significant Labs: All pertinent labs within the past 24 hours have been reviewed.  Blood Culture: No results for input(s): "LABBLOO" in the last 48 hours.  CBC:   Recent Labs   Lab 07/19/24  1649 07/20/24  0212 07/20/24  0549   WBC 12.09 10.26  --    HGB 5.1* 6.9* 6.5*   HCT 18.1* 22.9* 21.7*    174  --      CMP:   Recent Labs   Lab 07/19/24  1649 07/20/24  0212    144   K 4.3 4.5   * 118*   CO2 20* 18*   * 145*   BUN 30* 30*   CREATININE 1.5* 1.4   CALCIUM 8.9 8.8   PROT 5.3* 5.1*   ALBUMIN 3.0* 3.0*   BILITOT 0.3 0.4   ALKPHOS 64 60   AST 15 14   ALT 14 14   ANIONGAP 9 8       Significant Imaging: I have reviewed all pertinent imaging results/findings within the past 24 hours.    Assessment/Plan:      * Lower GI bleed  Patient has acute blood loss due to hemorrhage, the hemorrhage is due to gastrointestinal bleed, patient does have a propensity for bleeding due to a medication, the medication is ASA and diclofenac.. Will trend hemoglobin/hematocrit Every 6 hours x4, as well as monitor and correct for any coagulation defects. CBC and vital signs have been reviewed and last CBC was noted-   Lab Results   Component Value Date " "   WBC 10.26 07/20/2024    HGB 6.5 (L) 07/20/2024    HCT 21.7 (L) 07/20/2024    MCV 78 (L) 07/20/2024     07/20/2024         Will order a type and screen and consent patient for blood transfusion. Will transfuse if Hgb is <7g/dl (<8g/dl in cases of active ACS) or if patient has rapid bleeding leading to hemodynamic instability.  -presumed lower GI bleed (diverticular) with bright red blood per rectum however CTA unable to completely exclude small gastric bleed.  -CTA acute GI bleed with Slightly hyperdense focus in the gastric fundus measuring 13 mm may relate to ingested material. Small gastric bleed not excluded. Small fat umbilical hernia. Colonic diverticulosis. Punctate nonobstructing left renal calculi."   -INR 1.1, occult blood positive  -previous hemoglobin was 13.2 in February 2017 with no interim labs to compare with  -CLD, IV fluids, and transfuse 2 units packed red blood cells  -check H&H Q 6 hours x4 followed by CBC daily  -consult GI  -Protonix 80 mg IV x1 dose followed by Protonix 40 mg IV b.i.d.  -hold aspirin and NSAIDs  -cardiac monitoring    Clostridium difficile infection    C diff PCR and antigen positive toxin negative  Initiate vancomycin 125mg  q.6 hours    Hematochezia    Continue hemoglobin checks    Diarrhea  -white blood cell count 12.09, occult blood positive, sodium 145, potassium 4.3, creatinine 1.5, LFTs unremarkable, afebrile  -continue IV fluids  -Cdiff +, toxin negative, PCR +  Will start po vanco      H/O: gout  Hold allopurinol      Renal insufficiency  Current creatinine 1.5.  Last labs in February 2017 with creatinine 1.2 with no interim labs to compare with.  -recent UTI with urine culture 06/24/2024 with Streptococcus agalactiae group B treated with Augmentin  -repeat urinalysis with reflex culture  -continue IV fluids  -hold lisinopril  -check a.m. labs      BPH (benign prostatic hyperplasia)  Hold Flomax      Hyperlipidemia  Hold Lipitor      Diabetes mellitus type " 2, noninsulin dependent  Patient's FSGs are uncontrolled due to hyperglycemia on current medication regimen.  Last A1c reviewed-   Lab Results   Component Value Date    HGBA1C 6.7 (H) 07/20/2024     Most recent fingerstick glucose reviewed-   Recent Labs   Lab 07/19/24  2153 07/20/24  0610 07/20/24  1117   POCTGLUCOSE 154* 139* 121*     Current correctional scale  Low  Maintain anti-hyperglycemic dose as follows-   Antihyperglycemics (From admission, onward)      Start     Stop Route Frequency Ordered    07/19/24 2159  insulin aspart U-100 pen 0-5 Units         -- SubQ Before meals & nightly PRN 07/19/24 2100          Hold Oral hypoglycemics while patient is in the hospital.  Hold Jardiance.  Check A1c.    Primary hypertension  Chronic, controlled.  Hypotensive on arrival due to GI bleed.  Latest blood pressure and vitals reviewed-     Temp:  [98 °F (36.7 °C)-99 °F (37.2 °C)]   Pulse:  []   Resp:  [16-18]   BP: ()/(45-94)   SpO2:  [98 %-100 %] .   Home meds for hypertension were reviewed and noted below.   Hypertension Medications               amLODIPine (NORVASC) 10 MG tablet Take 10 mg by mouth once daily.    lisinopriL (PRINIVIL,ZESTRIL) 40 MG tablet Take 40 mg by mouth once daily.            While in the hospital, will manage blood pressure as follows; Adjust home antihypertensive regimen as follows- hold Norvasc and lisinopril.  P.r.n. IV hydralazine with parameters.    Will utilize p.r.n. blood pressure medication only if patient's blood pressure greater than 160/100 and he develops symptoms such as worsening chest pain or shortness of breath.    Hypotension  Hypotension related to severe anemia requiring transfusion  -transfuse packed red blood cells and give IV fluids  -hold all antihypertensives  -continue to monitor closely      Syncope  Syncope due to orthostatic hypotension related to acute GI bleed and symptomatic anemia requiring transfusion.  -transfuse 2 units packed red blood  cells  -status post 1 L normal saline IV fluid bolus in the emergency department  -continue normal saline at 100 mL/hr  -hold all antihypertensives  -fall precautions      Symptomatic anemia  Patient's anemia is currently worsening. Has received 2 units of PRBCs on 07/19/2024 . Etiology likely d/t acute blood loss which was from presumed lower GI bleed  Current CBC reviewed-   Lab Results   Component Value Date    HGB 6.5 (L) 07/20/2024    HCT 21.7 (L) 07/20/2024     Monitor serial CBC and transfuse if patient becomes hemodynamically unstable, symptomatic or H/H drops below 7/21.      VTE Risk Mitigation (From admission, onward)           Ordered     Reason for No Pharmacological VTE Prophylaxis  Once        Question:  Reasons:  Answer:  Active Bleeding    07/19/24 2100     IP VTE HIGH RISK PATIENT  Once         07/19/24 2100     Place sequential compression device  Until discontinued         07/19/24 2100                    Discharge Planning   DIA:      Code Status: Full Code   Is the patient medically ready for discharge?:     Reason for patient still in hospital (select all that apply): {HMREASONPATIENTINHOSP:16515}  Discharge Plan A: Home                  Faith Sampson MD  Department of Hospital Medicine   [facility] - Telemetry ([facility])

## 2024-07-20 NOTE — SUBJECTIVE & OBJECTIVE
Past Medical History:   Diagnosis Date    Diabetes mellitus     Gout     Hyperlipidemia     Hypertension        No past surgical history on file.    Review of patient's allergies indicates:  No Known Allergies    No current facility-administered medications on file prior to encounter.     Current Outpatient Medications on File Prior to Encounter   Medication Sig    allopurinol (ZYLOPRIM) 300 MG tablet Take 300 mg by mouth once daily.    amLODIPine (NORVASC) 10 MG tablet Take 10 mg by mouth once daily.    atorvastatin (LIPITOR) 80 MG tablet Take 80 mg by mouth once daily.    carboxymethylcellulose (REFRESH PLUS) 0.5 % Dpet Place 0.4 drops into both eyes 3 (three) times daily as needed.    cholecalciferol, vitamin D3, (VITAMIN D3) 400 unit Chew Take by mouth. Take two tablets by mouth daily    cyanocobalamin (VITAMIN B-12) 1000 MCG tablet Take 100 mcg by mouth once daily.    empagliflozin (JARDIANCE) 25 mg tablet Take 25 mg by mouth once daily.    lisinopriL (PRINIVIL,ZESTRIL) 40 MG tablet Take 40 mg by mouth once daily.    multivitamin with minerals tablet Take 1 tablet by mouth once daily.    sertraline (ZOLOFT) 100 MG tablet Take 100 mg by mouth once daily.    tamsulosin (FLOMAX) 0.4 mg Cap Take 1 capsule (0.4 mg total) by mouth once daily.    [DISCONTINUED] lisinopril-hydrochlorothiazide (PRINZIDE,ZESTORETIC) 20-12.5 mg per tablet Take 1 tablet by mouth once daily.     Family History    None       Tobacco Use    Smoking status: Former    Smokeless tobacco: Not on file   Substance and Sexual Activity    Alcohol use: Yes    Drug use: No    Sexual activity: Yes     Review of Systems   Constitutional:  Negative for chills and fever.   Gastrointestinal:  Positive for blood in stool and diarrhea. Negative for abdominal pain, nausea and vomiting.   Genitourinary:  Negative for difficulty urinating, dysuria and hematuria.   Neurological:  Positive for syncope.   All other systems reviewed and are negative.    Objective:      Vital Signs (Most Recent):  Temp: 98.2 °F (36.8 °C) (07/19/24 1915)  Pulse: 98 (07/19/24 1915)  Resp: 18 (07/19/24 1915)  BP: 119/64 (07/19/24 1915)  SpO2: 100 % (07/19/24 1915) Vital Signs (24h Range):  Temp:  [98.2 °F (36.8 °C)-99 °F (37.2 °C)] 98.2 °F (36.8 °C)  Pulse:  [] 98  Resp:  [16-18] 18  SpO2:  [100 %] 100 %  BP: ()/(45-94) 119/64     Weight: 111.1 kg (245 lb)  Body mass index is 35.15 kg/m².     Physical Exam  Vitals reviewed.   Constitutional:       General: He is not in acute distress.     Appearance: He is obese. He is not ill-appearing or diaphoretic.   HENT:      Nose: Nose normal.   Eyes:      Conjunctiva/sclera: Conjunctivae normal.   Cardiovascular:      Rate and Rhythm: Normal rate.   Pulmonary:      Effort: Pulmonary effort is normal. No respiratory distress.   Abdominal:      General: There is no distension.      Tenderness: There is no abdominal tenderness.   Musculoskeletal:         General: No swelling.   Skin:     General: Skin is warm and dry.   Neurological:      Mental Status: He is alert and oriented to person, place, and time.   Psychiatric:         Mood and Affect: Mood normal.         Behavior: Behavior normal.                Significant Labs: All pertinent labs within the past 24 hours have been reviewed.  Recent Lab Results         07/19/24  1650   07/19/24  1649        Unit Blood Type Code   5100  [P]          5100  [P]       Unit Expiration   [id number]  [P]          [id number]  [P]       Unit Blood Type   O POS  [P]          O POS  [P]       Albumin   3.0       ALP   64       ALT   14       Anion Gap   9       Aniso   Slight       AST   15       Baso #   0.02       Basophil %   0.2       BILIRUBIN TOTAL   0.3  Comment: For infants and newborns, interpretation of results should be based  on gestational age, weight and in agreement with clinical  observations.    Premature Infant recommended reference ranges:  Up to 24 hours.............<8.0 mg/dL  Up to 48  hours............<12.0 mg/dL  3-5 days..................<15.0 mg/dL  6-29 days.................<15.0 mg/dL         BUN   30       Calcium   8.9       Chloride   116       CO2   20       CODING SYSTEM   HQPS945  [P]          AFHQ835  [P]       Creatinine   1.5       Crossmatch Interpretation   Compatible  [P]          Compatible  [P]       Differential Method   Automated       DISPENSE STATUS   CROSSMATCHED  [P]          ISSUED  [P]       eGFR   48       Eos #   0.0       Eos %   0.2       Glucose   185       Gran # (ANC)   10.1       Gran %   83.4       Group & Rh   O POS       Hematocrit   18.1  Comment: hgb and hct critical result(s) called and verbal readback obtained   from [facility]  by [facility] 07/19/2024 17:01         Hemoglobin   5.1  Comment: hgb and hct critical result(s) called and verbal readback obtained   from [facility]  by [facility] 07/19/2024 17:01         Hypo   Occasional       Immature Grans (Abs)   0.06  Comment: Mild elevation in immature granulocytes is non specific and   can be seen in a variety of conditions including stress response,   acute inflammation, trauma and pregnancy. Correlation with other   laboratory and clinical findings is essential.         Immature Granulocytes   0.5       INDIRECT YAMINI   NEG       INR   1.1  Comment: Coumadin Therapy:  2.0 - 3.0 for INR for all indicators except mechanical heart valves  and antiphospholipid syndromes which should use 2.5 - 3.5.         Lymph #   1.3       Lymph %   10.9       MCH   20.2       MCHC   28.2       MCV   72       Mono #   0.6       Mono %   4.8       MPV   10.0       nRBC   0       Occult Blood Positive         Platelet Count   225       Potassium   4.3       Product Code   F4150T50  [P]          [id number]  [P]       PROTEIN TOTAL   5.3       PT   12.7       RBC   2.52       RDW   19.1       Sodium   145       Specimen Outdate   07/22/2024 23:59       Teardrop Cells   Occasional       UNIT NUMBER   [id number]  [P]           [id number]  [P]       WBC   12.09                [P] - Preliminary Result               Significant Imaging: I have reviewed all pertinent imaging results/findings within the past 24 hours.  CTA Acute GI [place], Abdomen and Pelvis   Final Result      Slightly hyperdense focus in the gastric fundus measuring 13 mm may relate to ingested material. Small gastric bleed not excluded. See for example series 2, image 40.      Small fat umbilical hernia      Colonic diverticulosis      Punctate nonobstructing left renal calculi      Recommend follow-up symptoms persist         Electronically signed by: Alexis Howell   Date:    07/19/2024   Time:    19:44

## 2024-07-20 NOTE — ASSESSMENT & PLAN NOTE
Plan:  -Clear liquid diet   -NPO after MN   -Bowel preparation tonight  -Colonoscopy tomorrow   -Transfuse if hemoglobin < 7

## 2024-07-20 NOTE — PLAN OF CARE
[facility] - Telemetry (Hospital)  Initial Discharge Assessment       Primary Care Provider: No, Primary Doctor    Admission Diagnosis: Anemia [D64.9]  Chest pain [R07.9]  Symptomatic anemia [D64.9]    Admission Date: 7/19/2024  Expected Discharge Date:     Transition of Care Barriers: None    Payor: MEDICARE / Plan: MEDICARE PART A & B / Product Type: Government /     Extended Emergency Contact Information  Primary Emergency Contact: Anne Rogerslene  Address: [address] [place] [address]           [place] [address] [facility]  Home Phone: [phone]  Mobile Phone: [phone]  Relation: Significant other    Discharge Plan A: Home         [id number] - [place] - [address]  [address] [facility]  [place] [address]  Phone: [phone] Fax: [phone]      Initial Assessment (most recent)       Adult Discharge Assessment - 07/20/24 1027          Discharge Assessment    Assessment Type Discharge Planning Assessment     Confirmed/corrected address, phone number and insurance Yes     Confirmed Demographics Correct on Facesheet     Source of Information patient     Does patient/caregiver understand observation status Yes     Communicated DIA with patient/caregiver Date not available/Unable to determine     People in Home alone     Do you expect to return to your current living situation? Yes     Do you have help at home or someone to help you manage your care at home? No     Prior to hospitilization cognitive status: Alert/Oriented     Current cognitive status: Alert/Oriented     Walking or Climbing Stairs Difficulty no     Dressing/Bathing Difficulty no     Equipment Currently Used at Home none     Readmission within 30 days? No     Patient currently being followed by outpatient case management? No     Do you currently have service(s) that help you manage your care at home? No     Do you take prescription medications? Yes     Do you have prescription coverage? Yes     Do you have any problems  affording any of your prescribed medications? No     Is the patient taking medications as prescribed? yes     Who is going to help you get home at discharge? family     How do you get to doctors appointments? car, drives self     Are you on dialysis? No     Do you take coumadin? No     Discharge Plan A Home     DME Needed Upon Discharge  none     Discharge Plan discussed with: Patient     Transition of Care Barriers None

## 2024-07-20 NOTE — ASSESSMENT & PLAN NOTE
Chronic, controlled.  Hypotensive on arrival due to GI bleed.  Latest blood pressure and vitals reviewed-     Temp:  [98 °F (36.7 °C)-99 °F (37.2 °C)]   Pulse:  []   Resp:  [16-18]   BP: ()/(45-94)   SpO2:  [98 %-100 %] .   Home meds for hypertension were reviewed and noted below.   Hypertension Medications               amLODIPine (NORVASC) 10 MG tablet Take 10 mg by mouth once daily.    lisinopriL (PRINIVIL,ZESTRIL) 40 MG tablet Take 40 mg by mouth once daily.            While in the hospital, will manage blood pressure as follows; Adjust home antihypertensive regimen as follows- hold Norvasc and lisinopril.  P.r.n. IV hydralazine with parameters.    Will utilize p.r.n. blood pressure medication only if patient's blood pressure greater than 160/100 and he develops symptoms such as worsening chest pain or shortness of breath.

## 2024-07-20 NOTE — ASSESSMENT & PLAN NOTE
Chronic, controlled.  Hypotensive on arrival due to GI bleed.  Latest blood pressure and vitals reviewed-     Temp:  [98.2 °F (36.8 °C)-99 °F (37.2 °C)]   Pulse:  []   Resp:  [16-18]   BP: ()/(45-94)   SpO2:  [100 %] .   Home meds for hypertension were reviewed and noted below.   Hypertension Medications               amLODIPine (NORVASC) 10 MG tablet Take 10 mg by mouth once daily.    lisinopriL (PRINIVIL,ZESTRIL) 40 MG tablet Take 40 mg by mouth once daily.            While in the hospital, will manage blood pressure as follows; Adjust home antihypertensive regimen as follows- hold Norvasc and lisinopril.  P.r.n. IV hydralazine with parameters.    Will utilize p.r.n. blood pressure medication only if patient's blood pressure greater than 160/100 and he develops symptoms such as worsening chest pain or shortness of breath.

## 2024-07-20 NOTE — NURSING TRANSFER
Nursing Transfer Note      7/19/2024   10:05 PM    Nurse giving handoff:Chantel  Nurse receiving handoff:Katerine    Reason patient is being transferred: Observa    Transfer To: Medsurg/Tele from the ED    Transfer via bed    Transfer with cardiac monitoring    Transported by ED staff    Transfer Vital Signs:  Blood Pressure:128/60  Heart Rate:99  O2:98  Temperature:98.2  Respirations:18    Telemetry: Box Number [id number], Rate 101, and Rhythm ST  Order for Tele Monitor? Yes    Additional Lines: NA    4eyes on Skin: yes    Medicines sent: NO    Any special needs or follow-up needed: NA    Patient belongings transferred with patient: Yes    Chart send with patient: Yes    Notified: spouse    Patient reassessed at: 07/19/2024 2205 Upon arrival to floor: cardiac monitor applied, patient oriented to room, call bell in reach, and bed in lowest position

## 2024-07-20 NOTE — ASSESSMENT & PLAN NOTE
Hypotension related to severe anemia requiring transfusion  -transfuse packed red blood cells and give IV fluids  -hold all antihypertensives  -continue to monitor closely

## 2024-07-20 NOTE — CONSULTS
[facility])  Gastroenterology  Consult Note    Patient Name: Mahin Newton  MRN: [id number]  Admission Date: 7/19/2024  Hospital Length of Stay: 1 days  Code Status: Full Code   Attending Provider: Faith Venegas MD   Consulting Provider: Abraham Ramires MD  Primary Care Physician: No, Primary Doctor  Principal Problem:Lower GI bleed    Inpatient consult to Gastroenterology  Consult performed by: Abraham Ramires MD  Consult ordered by: Ja Rai Jr., MD  Reason for consult: hematochezia        Subjective:     HPI:  Mr Newton is 78-year-old  man with history of non-insulin-dependent diabetes mellitus type 2, hypertension, hyperlipidemia, gout, BPH that presented to ED for anemia.   Patient had labs done yesterday and it revealed he was anemic.   PCP told him to go to the emergency room for transfusion if he develops symptoms.   He endorses a syncopal episode yesterday.   He states that he has been experience bright red blood per rectum intermittently since Wednesday.   He has not had any more bloody bowel movements since being admitted to the hospital.   Had a colonoscopy two years ago that was normal.   Denies nausea, vomiting, hematemesis,melena or abdominal pain.   Denies family history of colon cancer.   Patient is not on anticoagulation or antiplatelets.   H/H on admission was 5.1/18.7 (13.7/40.7 seven years ago).   CTA revealed slightly hyperdense focus in the gastric fundus measuring 13 mm may relate to ingested material. Small gastric bleed not excluded.     Past Medical History:   Diagnosis Date    Diabetes mellitus     Gout     Hyperlipidemia     Hypertension        No past surgical history on file.    Review of patient's allergies indicates:  No Known Allergies  Family History    None       Tobacco Use    Smoking status: Former    Smokeless tobacco: Not on file   Substance and Sexual Activity    Alcohol use: Yes    Drug use: No    Sexual activity: Yes      Review of Systems   Constitutional:  Positive for activity change.   Gastrointestinal:  Positive for blood in stool. Negative for abdominal distention, abdominal pain, diarrhea, nausea, rectal pain and vomiting.     Objective:     Vital Signs (Most Recent):  Temp: 98.4 °F (36.9 °C) (07/20/24 1157)  Pulse: 83 (07/20/24 1157)  Resp: 18 (07/20/24 1157)  BP: 124/69 (07/20/24 1157)  SpO2: 98 % (07/20/24 1157) Vital Signs (24h Range):  Temp:  [98 °F (36.7 °C)-99 °F (37.2 °C)] 98.4 °F (36.9 °C)  Pulse:  [] 83  Resp:  [16-18] 18  SpO2:  [98 %-100 %] 98 %  BP: ()/(45-94) 124/69     Weight: 111.1 kg (245 lb) (07/20/24 0419)  Body mass index is 35.15 kg/m².      Intake/Output Summary (Last 24 hours) at 7/20/2024 1349  Last data filed at 7/20/2024 0635  Gross per 24 hour   Intake 1464 ml   Output 950 ml   Net 514 ml       Lines/Drains/Airways       Peripheral Intravenous Line  Duration                  Peripheral IV - Single Lumen 07/19/24 20 G Right Antecubital 1 day         Peripheral IV - Single Lumen 07/19/24 1635 18 G Anterior;Left;Proximal Forearm <1 day                     Physical Exam  Constitutional:       Appearance: Normal appearance.   HENT:      Head: Normocephalic.   Eyes:      Conjunctiva/sclera: Conjunctivae normal.   Pulmonary:      Effort: Pulmonary effort is normal.   Abdominal:      General: There is no distension.      Palpations: Abdomen is soft.      Tenderness: There is no abdominal tenderness. There is no guarding.   Neurological:      Mental Status: He is alert.          Significant Labs:  CBC:   Recent Labs   Lab 07/19/24  1649 07/20/24  0212 07/20/24  0549   WBC 12.09 10.26  --    HGB 5.1* 6.9* 6.5*   HCT 18.1* 22.9* 21.7*    174  --      CMP:   Recent Labs   Lab 07/20/24  0212   *   CALCIUM 8.8   ALBUMIN 3.0*   PROT 5.1*      K 4.5   CO2 18*   *   BUN 30*   CREATININE 1.4   ALKPHOS 60   ALT 14   AST 14   BILITOT 0.4     Coagulation:   Recent Labs   Lab  07/19/24  1649   INR 1.1       Significant Imaging:  Imaging results within the past 24 hours have been reviewed.  Assessment/Plan:     GI  Hematochezia  Plan:  -Clear liquid diet   -NPO after MN   -Bowel preparation tonight  -Colonoscopy tomorrow   -Transfuse if hemoglobin < 7        Thank you for your consult. I will follow-up with patient. Please contact us if you have any additional questions.    Abraham Ramires MD  Gastroenterology  [facility] Telemetry ([facility])

## 2024-07-20 NOTE — ASSESSMENT & PLAN NOTE
Patient's anemia is currently worsening. Has received 2 units of PRBCs on 07/19/2024 . Etiology likely d/t acute blood loss which was from presumed lower GI bleed  Current CBC reviewed-   Lab Results   Component Value Date    HGB 6.5 (L) 07/20/2024    HCT 21.7 (L) 07/20/2024     Monitor serial CBC and transfuse if patient becomes hemodynamically unstable, symptomatic or H/H drops below 7/21.

## 2024-07-20 NOTE — PROGRESS NOTES
[facility] Medicine  Progress Note    Patient Name: Mahin Newton  MRN: [id number]  Patient Class: IP- Inpatient   Admission Date: 7/19/2024  Length of Stay: 1 days  Attending Physician: Faith Venegas MD  Primary Care Provider: Miranda, Primary Doctor        Subjective:     Principal Problem:Lower GI bleed        HPI:  78-year-old  man with history of non-insulin-dependent diabetes mellitus type 2, hypertension, hyperlipidemia, gout, BPH, and obesity who was [facility] due to abnormal blood work with anemia requiring transfusion.  Patient reports that he has had bright red blood per rectum intermittently over the last day, 6 episodes, moderate volume, no associated abdominal pain, and no bleeding elsewhere.  He does report diarrhea intermixed with the bleeding.  Patient had a colonoscopy approximately 1-2 years ago that was normal by his report.  Last colonoscopy in our system was 08/14/2017 with cecal polyp (tubular adenoma) and rectal polyp (inflammatory hyperplastic polyp).  Patient is on aspirin 81 mg daily and diclofenac.  He has on no other NSAIDs, antiplatelet therapy, or anticoagulation.  Patient reports that he was in bed and was getting up to go to the bathroom when he became dizzy and passed out.  He sustained no injuries.  He did lose consciousness for about 15 seconds per his wife who witnessed episode.  Orthostatic vital signs are positive in our emergency department.  Hemoglobin is 5.1.    Patient had recent outpatient urology appointment 06/24/2024 with urinalysis ordered.  Urine culture with Streptococcus agalactiae group B. Patient was prescribed Augmentin by his urologist on 06/27/2024 which he has completed.    Overview/Hospital Course:  Patient is a 71-year-old male with a history of diabetes, hypertension, hyperlipidemia, gout, BPH who was [facility] after routine blood work showed a hemoglobin of 5.9.  While patient was  attempting to get up out of the bed he had a syncopal episode; his wife called EMS and he presented.  He noted the past few days to have diarrhea with no associated abdominal pain but bright red blood per rectum.  He reports he had colonoscopy 1-2 years ago which was normal.  Patient's hemoglobin emergency department was 5.1.  He was admitted transfused 2 units of packed blood cells.  He had CTA performed which revealed hyperdense focus in the gastric fundus measuring 13 mm.  GI was consulted with plans to scope on 07/21 but C diff returned positive.  He was started on oral vancomycin and Colonoscopy was canceled.  We will continue to monitor hemoglobin    Interval History:  Patient seen and examined bedside.  States he is feeling much better.  No longer lightheaded dizzy.  No further diarrhea.    Review of Systems   Constitutional:  Positive for activity change. Negative for chills and fever.   Respiratory:  Negative for cough and shortness of breath.    Cardiovascular:  Negative for chest pain, palpitations and leg swelling.   Gastrointestinal:  Positive for blood in stool and diarrhea. Negative for abdominal pain, nausea and vomiting.   Genitourinary:  Negative for difficulty urinating, dysuria and hematuria.   Neurological:  Positive for syncope.   All other systems reviewed and are negative.    Objective:     Vital Signs (Most Recent):  Temp: 98.4 °F (36.9 °C) (07/20/24 1157)  Pulse: 83 (07/20/24 1157)  Resp: 18 (07/20/24 1157)  BP: 124/69 (07/20/24 1157)  SpO2: 98 % (07/20/24 1157) Vital Signs (24h Range):  Temp:  [98 °F (36.7 °C)-99 °F (37.2 °C)] 98.4 °F (36.9 °C)  Pulse:  [] 83  Resp:  [16-18] 18  SpO2:  [98 %-100 %] 98 %  BP: ()/(45-94) 124/69     Weight: 111.1 kg (245 lb)  Body mass index is 35.15 kg/m².    Intake/Output Summary (Last 24 hours) at 7/20/2024 1446  Last data filed at 7/20/2024 0635  Gross per 24 hour   Intake 1464 ml   Output 950 ml   Net 514 ml         Physical Exam  Vitals  reviewed.   Constitutional:       General: He is not in acute distress.     Appearance: He is obese. He is not ill-appearing or diaphoretic.   HENT:      Nose: Nose normal.      Mouth/Throat:      Mouth: Mucous membranes are moist.      Pharynx: Oropharynx is clear.   Eyes:      Conjunctiva/sclera: Conjunctivae normal.   Cardiovascular:      Rate and Rhythm: Normal rate and regular rhythm.   Pulmonary:      Effort: Pulmonary effort is normal. No respiratory distress.   Abdominal:      General: There is no distension.      Tenderness: There is no abdominal tenderness.   Musculoskeletal:         General: No swelling.   Skin:     General: Skin is warm and dry.   Neurological:      Mental Status: He is alert and oriented to person, place, and time.   Psychiatric:         Mood and Affect: Mood normal.         Behavior: Behavior normal.           C diff antigen positive, toxin negative, PCR positive  Significant Labs: All pertinent labs within the past 24 hours have been reviewed.  C diff PCR positive  CBC:   Recent Labs   Lab 07/19/24  1649 07/20/24  0212 07/20/24  0549   WBC 12.09 10.26  --    HGB 5.1* 6.9* 6.5*   HCT 18.1* 22.9* 21.7*    174  --      CMP:   Recent Labs   Lab 07/19/24  1649 07/20/24  0212    144   K 4.3 4.5   * 118*   CO2 20* 18*   * 145*   BUN 30* 30*   CREATININE 1.5* 1.4   CALCIUM 8.9 8.8   PROT 5.3* 5.1*   ALBUMIN 3.0* 3.0*   BILITOT 0.3 0.4   ALKPHOS 64 60   AST 15 14   ALT 14 14   ANIONGAP 9 8       Significant Imaging: I have reviewed all pertinent imaging results/findings within the past 24 hours.    Assessment/Plan:      * Lower GI bleed  Patient has acute blood loss due to hemorrhage, the hemorrhage is due to gastrointestinal bleed, patient does have a propensity for bleeding due to a medication, the medication is ASA and diclofenac.. Will trend hemoglobin/hematocrit Every 6 hours x4, as well as monitor and correct for any coagulation defects. CBC and vital signs have  "been reviewed and last CBC was noted-   Lab Results   Component Value Date    WBC 10.26 07/20/2024    HGB 6.5 (L) 07/20/2024    HCT 21.7 (L) 07/20/2024    MCV 78 (L) 07/20/2024     07/20/2024         Will order a type and screen and consent patient for blood transfusion. Will transfuse if Hgb is <7g/dl (<8g/dl in cases of active ACS) or if patient has rapid bleeding leading to hemodynamic instability.  -presumed lower GI bleed (diverticular) with bright red blood per rectum however CTA unable to completely exclude small gastric bleed.  -CTA acute GI bleed with Slightly hyperdense focus in the gastric fundus measuring 13 mm may relate to ingested material. Small gastric bleed not excluded. Small fat umbilical hernia. Colonic diverticulosis. Punctate nonobstructing left renal calculi."   -INR 1.1, occult blood positive  -previous hemoglobin was 13.2 in February 2017 with no interim labs to compare with  -advance diet, IV fluids, and transfuse 2 units packed red blood cells  -check H&H Q 6 hours x4 followed by CBC daily  -GI consult appreciated  -hold aspirin and NSAIDs  -cardiac monitoring    Clostridium difficile infection    C diff PCR and antigen positive toxin negative  Initiate vancomycin 125mg  q.6 hours  Patient will need to follow up with GI after C diff cleared for colonoscopy    Hematochezia    Continue hemoglobin checks  C diff positive  As long as C diff resolves patient will not undergo colonoscopy during this admission  He will need to follow up for colonoscopy once C diff resolved    Diarrhea  -white blood cell count 12.09, occult blood positive, sodium 145, potassium 4.3, creatinine 1.5, LFTs unremarkable, afebrile  -continue IV fluids  -Cdiff +, toxin negative, PCR +  Will start po vanco      H/O: gout  Hold allopurinol      Renal insufficiency  Current creatinine 1.5.  Last labs in February 2017 with creatinine 1.2 with no interim labs to compare with.  -recent UTI with urine culture " 06/24/2024 with Streptococcus agalactiae group B treated with Augmentin  -repeat urinalysis with reflex culture  -continue IV fluids  -hold lisinopril  -check a.m. labs      BPH (benign prostatic hyperplasia)  Hold Flomax      Hyperlipidemia  Hold Lipitor      Diabetes mellitus type 2, noninsulin dependent  Patient's FSGs are uncontrolled due to hyperglycemia on current medication regimen.  Last A1c reviewed-   Lab Results   Component Value Date    HGBA1C 6.7 (H) 07/20/2024     Most recent fingerstick glucose reviewed-   Recent Labs   Lab 07/19/24  2153 07/20/24  0610 07/20/24  1117   POCTGLUCOSE 154* 139* 121*     Current correctional scale  Low  Maintain anti-hyperglycemic dose as follows-   Antihyperglycemics (From admission, onward)      Start     Stop Route Frequency Ordered    07/19/24 2159  insulin aspart U-100 pen 0-5 Units         -- SubQ Before meals & nightly PRN 07/19/24 2100          Hold Oral hypoglycemics while patient is in the hospital.  Hold Jardiance.  Check A1c.    Primary hypertension  Chronic, controlled.  Hypotensive on arrival due to GI bleed.  Latest blood pressure and vitals reviewed-     Temp:  [98 °F (36.7 °C)-99 °F (37.2 °C)]   Pulse:  []   Resp:  [16-18]   BP: ()/(45-94)   SpO2:  [98 %-100 %] .   Home meds for hypertension were reviewed and noted below.   Hypertension Medications               amLODIPine (NORVASC) 10 MG tablet Take 10 mg by mouth once daily.    lisinopriL (PRINIVIL,ZESTRIL) 40 MG tablet Take 40 mg by mouth once daily.            While in the hospital, will manage blood pressure as follows; Adjust home antihypertensive regimen as follows- hold Norvasc and lisinopril.  P.r.n. IV hydralazine with parameters.    Will utilize p.r.n. blood pressure medication only if patient's blood pressure greater than 160/100 and he develops symptoms such as worsening chest pain or shortness of breath.    Hypotension  Hypotension related to severe anemia requiring  transfusion  -transfuse packed red blood cells and give IV fluids  -hold all antihypertensives  -continue to monitor closely      Syncope  Syncope due to orthostatic hypotension related to acute GI bleed and symptomatic anemia requiring transfusion.  -transfuse 2 units packed red blood cells  -status post 1 L normal saline IV fluid bolus in the emergency department  -continue normal saline at 100 mL/hr  -hold all antihypertensives  -fall precautions      Symptomatic anemia  Patient's anemia is currently worsening. Has received 2 units of PRBCs on 07/19/2024 . Etiology likely d/t acute blood loss which was from presumed lower GI bleed  Current CBC reviewed-   Lab Results   Component Value Date    HGB 6.5 (L) 07/20/2024    HCT 21.7 (L) 07/20/2024     Monitor serial CBC and transfuse if patient becomes hemodynamically unstable, symptomatic or H/H drops below 7/21.      VTE Risk Mitigation (From admission, onward)           Ordered     Reason for No Pharmacological VTE Prophylaxis  Once        Question:  Reasons:  Answer:  Active Bleeding    07/19/24 2100     IP VTE HIGH RISK PATIENT  Once         07/19/24 2100     Place sequential compression device  Until discontinued         07/19/24 2100                    Discharge Planning   DIA:      Code Status: Full Code   Is the patient medically ready for discharge?:     Reason for patient still in hospital (select all that apply): Patient trending condition, Treatment, and Consult recommendations  Discharge Plan A: Home                  Faith Sampson MD  Department of Hospital Medicine   [facility] Tele[facility] ([facility])

## 2024-07-20 NOTE — ASSESSMENT & PLAN NOTE
"Patient has acute blood loss due to hemorrhage, the hemorrhage is due to gastrointestinal bleed, patient does have a propensity for bleeding due to a medication, the medication is ASA and diclofenac.. Will trend hemoglobin/hematocrit Every 6 hours x4, as well as monitor and correct for any coagulation defects. CBC and vital signs have been reviewed and last CBC was noted-   Lab Results   Component Value Date    WBC 12.09 07/19/2024    HGB 5.1 (LL) 07/19/2024    HCT 18.1 (LL) 07/19/2024    MCV 72 (L) 07/19/2024     07/19/2024         Will order a type and screen and consent patient for blood transfusion. Will transfuse if Hgb is <7g/dl (<8g/dl in cases of active ACS) or if patient has rapid bleeding leading to hemodynamic instability.  -presumed lower GI bleed (diverticular) with bright red blood per rectum however CTA unable to completely exclude small gastric bleed.  -CTA acute GI bleed with Slightly hyperdense focus in the gastric fundus measuring 13 mm may relate to ingested material. Small gastric bleed not excluded. Small fat umbilical hernia. Colonic diverticulosis. Punctate nonobstructing left renal calculi."   -INR 1.1, occult blood positive  -previous hemoglobin was 13.2 in February 2017 with no interim labs to compare with  -NPO, IV fluids, and transfuse 2 units packed red blood cells  -check H&H Q 6 hours x4 followed by CBC daily  -consult GI  -Protonix 80 mg IV x1 dose followed by Protonix 40 mg IV b.i.d.  -hold aspirin and NSAIDs  -cardiac monitoring  "

## 2024-07-20 NOTE — HOSPITAL COURSE
Patient is a 71-year-old male with a history of diabetes, hypertension, hyperlipidemia, gout, BPH who was [facility] after routine blood work showed a hemoglobin of 5.9.  While patient was attempting to get up out of the bed he had a syncopal episode; his wife called EMS and he presented.  He noted the past few days to have diarrhea with no associated abdominal pain but bright red blood per rectum.  He reports he had colonoscopy 1-2 years ago which was normal.  Patient's hemoglobin emergency department was 5.1.  He was admitted transfused 2 units of packed blood cells.  He had CTA performed which revealed hyperdense focus in the gastric fundus measuring 13 mm.  GI was consulted with plans to scope on 07/21 but C diff returned positive.  He was started on oral vancomycin and Colonoscopy was canceled.    Patient's diarrhea resolved  Hemoglobin remained stable  Discharged home on vancomycin 125 p.o. q.6 hours for a 10 day course  He is to follow up with his PCP in 3-5 days  Follow up with GI once colitis resolved for colonoscopy given significant anemia  Patient seen examined on day of discharge stable for discharge

## 2024-07-20 NOTE — PHARMACY MED REC
"Admission Medication History     The home medication history was taken by Kevin Grimes.    You may go to "Admission" then "Reconcile Home Medications" tabs to review and/or act upon these items.     The home medication list has been updated by the Pharmacy department.   Please read ALL comments highlighted in yellow.   Please address this information as you see fit.    Feel free to contact us if you have any questions or require assistance.      The medications listed below were removed from the home medication list. Please reorder if appropriate:  Patient reports no longer taking the following medication(s):  ZESTORETIC 20-12.5MG    Medications listed below were obtained from: Patient/family, Medications brought from home, and Analytic software- [employer]  (Not in a hospital admission)        Kevin Grimes  [id number]    Current Outpatient Medications on File Prior to Encounter   Medication Sig Dispense Refill Last Dose    allopurinol (ZYLOPRIM) 300 MG tablet Take 300 mg by mouth once daily.   7/19/2024    amLODIPine (NORVASC) 10 MG tablet Take 10 mg by mouth once daily.   7/19/2024    atorvastatin (LIPITOR) 80 MG tablet Take 80 mg by mouth once daily.   7/19/2024    carboxymethylcellulose (REFRESH PLUS) 0.5 % Dpet Place 0.4 drops into both eyes 3 (three) times daily as needed.   7/19/2024    cholecalciferol, vitamin D3, (VITAMIN D3) 400 unit Chew Take by mouth. Take two tablets by mouth daily   7/19/2024    cyanocobalamin (VITAMIN B-12) 1000 MCG tablet Take 100 mcg by mouth once daily.   7/19/2024    empagliflozin (JARDIANCE) 25 mg tablet Take 25 mg by mouth once daily.   7/19/2024    lisinopriL (PRINIVIL,ZESTRIL) 40 MG tablet Take 40 mg by mouth once daily.   7/19/2024    multivitamin with minerals tablet Take 1 tablet by mouth once daily.   7/19/2024    sertraline (ZOLOFT) 100 MG tablet Take 100 mg by mouth once daily.   7/19/2024    tamsulosin (FLOMAX) 0.4 mg Cap Take 1 capsule (0.4 mg total) by mouth " once daily. 30 capsule 11 7/19/2024                           .

## 2024-07-20 NOTE — ASSESSMENT & PLAN NOTE
"Patient has acute blood loss due to hemorrhage, the hemorrhage is due to gastrointestinal bleed, patient does have a propensity for bleeding due to a medication, the medication is ASA and diclofenac.. Will trend hemoglobin/hematocrit Every 6 hours x4, as well as monitor and correct for any coagulation defects. CBC and vital signs have been reviewed and last CBC was noted-   Lab Results   Component Value Date    WBC 10.26 07/20/2024    HGB 6.5 (L) 07/20/2024    HCT 21.7 (L) 07/20/2024    MCV 78 (L) 07/20/2024     07/20/2024         Will order a type and screen and consent patient for blood transfusion. Will transfuse if Hgb is <7g/dl (<8g/dl in cases of active ACS) or if patient has rapid bleeding leading to hemodynamic instability.  -presumed lower GI bleed (diverticular) with bright red blood per rectum however CTA unable to completely exclude small gastric bleed.  -CTA acute GI bleed with Slightly hyperdense focus in the gastric fundus measuring 13 mm may relate to ingested material. Small gastric bleed not excluded. Small fat umbilical hernia. Colonic diverticulosis. Punctate nonobstructing left renal calculi."   -INR 1.1, occult blood positive  -previous hemoglobin was 13.2 in February 2017 with no interim labs to compare with  -advance diet, IV fluids, and transfuse 2 units packed red blood cells  -check H&H Q 6 hours x4 followed by CBC daily  -GI consult appreciated  -hold aspirin and NSAIDs  -cardiac monitoring  "

## 2024-07-20 NOTE — HPI
78-year-old  man with history of non-insulin-dependent diabetes mellitus type 2, hypertension, hyperlipidemia, gout, BPH, and obesity who was [facility] by [facility] due to abnormal blood work with anemia requiring transfusion.  Patient reports that he has had bright red blood per rectum intermittently over the last day, 6 episodes, moderate volume, no associated abdominal pain, and no bleeding elsewhere.  He does report diarrhea intermixed with the bleeding.  Patient had a colonoscopy approximately 1-2 years ago that was normal by his report.  Last colonoscopy in our system was 08/14/2017 with cecal polyp (tubular adenoma) and rectal polyp (inflammatory hyperplastic polyp).  Patient is on aspirin 81 mg daily and diclofenac.  He has on no other NSAIDs, antiplatelet therapy, or anticoagulation.  Patient reports that he was in bed and was getting up to go to the bathroom when he became dizzy and passed out.  He sustained no injuries.  He did lose consciousness for about 15 seconds per his wife who witnessed episode.  Orthostatic vital signs are positive in our emergency department.  Hemoglobin is 5.1.    Patient had recent outpatient urology appointment 06/24/2024 with urinalysis ordered.  Urine culture with Streptococcus agalactiae group B. Patient was prescribed Augmentin by his urologist on 06/27/2024 which he has completed.

## 2024-07-20 NOTE — ASSESSMENT & PLAN NOTE
Current creatinine 1.5.  Last labs in February 2017 with creatinine 1.2 with no interim labs to compare with.  -recent UTI with urine culture 06/24/2024 with Streptococcus agalactiae group B treated with Augmentin  -repeat urinalysis with reflex culture  -continue IV fluids  -hold lisinopril  -check a.m. labs

## 2024-07-20 NOTE — ASSESSMENT & PLAN NOTE
Syncope due to orthostatic hypotension related to acute GI bleed and symptomatic anemia requiring transfusion.  -transfuse 2 units packed red blood cells  -status post 1 L normal saline IV fluid bolus in the emergency department  -continue normal saline at 100 mL/hr  -hold all antihypertensives  -fall precautions

## 2024-07-20 NOTE — ASSESSMENT & PLAN NOTE
Continue hemoglobin checks  C diff positive  As long as C diff resolves patient will not undergo colonoscopy during this admission  He will need to follow up for colonoscopy once C diff resolved

## 2024-07-21 VITALS
BODY MASS INDEX: 35.03 KG/M2 | WEIGHT: 244.69 LBS | TEMPERATURE: 98 F | RESPIRATION RATE: 16 BRPM | HEART RATE: 108 BPM | HEIGHT: 70 IN | DIASTOLIC BLOOD PRESSURE: 71 MMHG | SYSTOLIC BLOOD PRESSURE: 138 MMHG | OXYGEN SATURATION: 96 %

## 2024-07-21 PROBLEM — R55 SYNCOPE: Status: RESOLVED | Noted: 2024-07-19 | Resolved: 2024-07-21

## 2024-07-21 PROBLEM — D64.9 SYMPTOMATIC ANEMIA: Status: RESOLVED | Noted: 2024-07-19 | Resolved: 2024-07-21

## 2024-07-21 LAB
BASOPHILS # BLD AUTO: 0.03 K/UL (ref 0–0.2)
BASOPHILS NFR BLD: 0.4 % (ref 0–1.9)
DIFFERENTIAL METHOD BLD: ABNORMAL
EOSINOPHIL # BLD AUTO: 0.1 K/UL (ref 0–0.5)
EOSINOPHIL NFR BLD: 1.1 % (ref 0–8)
ERYTHROCYTE [DISTWIDTH] IN BLOOD BY AUTOMATED COUNT: 19.9 % (ref 11.5–14.5)
HCT VFR BLD AUTO: 26.9 % (ref 40–54)
HGB BLD-MCNC: 8.2 G/DL (ref 14–18)
HGB BLD-MCNC: 9.2 G/DL (ref 14–18)
IMM GRANULOCYTES # BLD AUTO: 0.05 K/UL (ref 0–0.04)
IMM GRANULOCYTES NFR BLD AUTO: 0.7 % (ref 0–0.5)
LYMPHOCYTES # BLD AUTO: 1.3 K/UL (ref 1–4.8)
LYMPHOCYTES NFR BLD: 17.3 % (ref 18–48)
MCH RBC QN AUTO: 24.6 PG (ref 27–31)
MCHC RBC AUTO-ENTMCNC: 30.5 G/DL (ref 32–36)
MCV RBC AUTO: 81 FL (ref 82–98)
MONOCYTES # BLD AUTO: 0.7 K/UL (ref 0.3–1)
MONOCYTES NFR BLD: 9.5 % (ref 4–15)
NEUTROPHILS # BLD AUTO: 5.2 K/UL (ref 1.8–7.7)
NEUTROPHILS NFR BLD: 71 % (ref 38–73)
NRBC BLD-RTO: 0 /100 WBC
OHS QRS DURATION: 88 MS
OHS QTC CALCULATION: 444 MS
PLATELET # BLD AUTO: 137 K/UL (ref 150–450)
PMV BLD AUTO: 10.5 FL (ref 9.2–12.9)
POCT GLUCOSE: 100 MG/DL (ref 70–110)
POCT GLUCOSE: 119 MG/DL (ref 70–110)
RBC # BLD AUTO: 3.33 M/UL (ref 4.6–6.2)
WBC # BLD AUTO: 7.35 K/UL (ref 3.9–12.7)

## 2024-07-21 PROCEDURE — 25000003 PHARM REV CODE 250: Performed by: INTERNAL MEDICINE

## 2024-07-21 PROCEDURE — 36415 COLL VENOUS BLD VENIPUNCTURE: CPT | Performed by: INTERNAL MEDICINE

## 2024-07-21 PROCEDURE — 85018 HEMOGLOBIN: CPT | Performed by: INTERNAL MEDICINE

## 2024-07-21 PROCEDURE — 99232 SBSQ HOSP IP/OBS MODERATE 35: CPT | Mod: ,,, | Performed by: INTERNAL MEDICINE

## 2024-07-21 PROCEDURE — 85025 COMPLETE CBC W/AUTO DIFF WBC: CPT | Performed by: INTERNAL MEDICINE

## 2024-07-21 RX ORDER — FAMOTIDINE 20 MG/1
20 TABLET, FILM COATED ORAL 2 TIMES DAILY
Qty: 60 TABLET | Refills: 0 | Status: SHIPPED | OUTPATIENT
Start: 2024-07-21 | End: 2024-08-21

## 2024-07-21 RX ORDER — ALLOPURINOL 300 MG/1
300 TABLET ORAL DAILY
Status: DISCONTINUED | OUTPATIENT
Start: 2024-07-21 | End: 2024-07-21 | Stop reason: HOSPADM

## 2024-07-21 RX ORDER — SERTRALINE HYDROCHLORIDE 50 MG/1
100 TABLET, FILM COATED ORAL DAILY
Status: DISCONTINUED | OUTPATIENT
Start: 2024-07-21 | End: 2024-07-21 | Stop reason: HOSPADM

## 2024-07-21 RX ORDER — TAMSULOSIN HYDROCHLORIDE 0.4 MG/1
0.4 CAPSULE ORAL DAILY
Status: DISCONTINUED | OUTPATIENT
Start: 2024-07-21 | End: 2024-07-21 | Stop reason: HOSPADM

## 2024-07-21 RX ORDER — VANCOMYCIN HYDROCHLORIDE 125 MG/1
125 CAPSULE ORAL EVERY 6 HOURS
Qty: 52 CAPSULE | Refills: 0 | Status: SHIPPED | OUTPATIENT
Start: 2024-07-21 | End: 2024-08-04

## 2024-07-21 RX ADMIN — VANCOMYCIN HYDROCHLORIDE 125 MG: KIT at 02:07

## 2024-07-21 RX ADMIN — SERTRALINE HYDROCHLORIDE 100 MG: 50 TABLET ORAL at 11:07

## 2024-07-21 RX ADMIN — ALLOPURINOL 300 MG: 300 TABLET ORAL at 11:07

## 2024-07-21 RX ADMIN — TAMSULOSIN HYDROCHLORIDE 0.4 MG: 0.4 CAPSULE ORAL at 11:07

## 2024-07-21 RX ADMIN — VANCOMYCIN HYDROCHLORIDE 125 MG: KIT at 11:07

## 2024-07-21 RX ADMIN — FAMOTIDINE 20 MG: 20 TABLET ORAL at 09:07

## 2024-07-21 RX ADMIN — VANCOMYCIN HYDROCHLORIDE 125 MG: KIT at 05:07

## 2024-07-21 RX ADMIN — SODIUM CHLORIDE: 9 INJECTION, SOLUTION INTRAVENOUS at 07:07

## 2024-07-21 RX ADMIN — VANCOMYCIN HYDROCHLORIDE 125 MG: KIT at 06:07

## 2024-07-21 NOTE — PROGRESS NOTES
[facility] [place])  Gastroenterology  Progress Note    Patient Name: Mahin Newton  MRN: [id number]  Admission Date: 7/19/2024  Hospital Length of Stay: 2 days  Code Status: Full Code   Attending Provider: Faith Venegas MD  Consulting Provider: Abraham Ramires MD  Primary Care Physician: No, Primary Doctor  Principal Problem: Lower GI bleed        Subjective:     Interval History: PCR came back positive for C. Diff.   Patient was started on vancomycin    Review of Systems   Gastrointestinal:  Positive for blood in stool.     Objective:     Vital Signs (Most Recent):  Temp: 98.5 °F (36.9 °C) (07/21/24 1129)  Pulse: 82 (07/21/24 1129)  Resp: 16 (07/21/24 1129)  BP: 137/79 (07/21/24 1129)  SpO2: (!) 94 % (07/21/24 1300) Vital Signs (24h Range):  Temp:  [97.8 °F (36.6 °C)-98.5 °F (36.9 °C)] 98.5 °F (36.9 °C)  Pulse:  [68-97] 82  Resp:  [16-18] 16  SpO2:  [94 %-99 %] 94 %  BP: (130-141)/(67-79) 137/79     Weight: 111 kg (244 lb 11.4 oz) (07/21/24 0722)  Body mass index is 35.11 kg/m².      Intake/Output Summary (Last 24 hours) at 7/21/2024 1547  Last data filed at 7/21/2024 0619  Gross per 24 hour   Intake 1663.92 ml   Output 900 ml   Net 763.92 ml       Lines/Drains/Airways       Peripheral Intravenous Line  Duration                  Peripheral IV - Single Lumen 07/19/24 20 G Right Antecubital 2 days         Peripheral IV - Single Lumen 07/19/24 1635 18 G Anterior;Left;Proximal Forearm 1 day                     Physical Exam  Constitutional:       Appearance: Normal appearance.   HENT:      Head: Normocephalic.   Eyes:      Conjunctiva/sclera: Conjunctivae normal.   Pulmonary:      Effort: Pulmonary effort is normal.   Abdominal:      General: There is no distension.      Palpations: Abdomen is soft.      Tenderness: There is no abdominal tenderness. There is no guarding.   Neurological:      Mental Status: He is alert.          Significant Labs:  CBC:   Recent Labs   Lab 07/19/24  1647  07/20/24  0212 07/20/24  0549 07/20/24  1919 07/21/24  0500 07/21/24  1159   WBC 12.09 10.26  --   --  7.35  --    HGB 5.1* 6.9* 6.5* 8.7* 8.2* 9.2*   HCT 18.1* 22.9* 21.7* 27.5* 26.9*  --     174  --   --  137*  --      CMP:   Recent Labs   Lab 07/20/24  0212   *   CALCIUM 8.8   ALBUMIN 3.0*   PROT 5.1*      K 4.5   CO2 18*   *   BUN 30*   CREATININE 1.4   ALKPHOS 60   ALT 14   AST 14   BILITOT 0.4     Coagulation:   Recent Labs   Lab 07/19/24  1649   INR 1.1         Significant Imaging:  Imaging results within the past 24 hours have been reviewed.    Assessment/Plan:     ID  Clostridioides difficile infection  -Please treat with Vancomycin PO for at least 10-14 days   -We will arrange follow-up in GI clinic     GI  Hematochezia  Plan:  -Likely secondary to C. Difficile infection   -Plan as above        Thank you for your consult. I will sign off. Please contact us if you have any additional questions.    Abraham Ramires MD  Gastroenterology  [facility] Tele[facility] ([facility])

## 2024-07-21 NOTE — PLAN OF CARE
Problem: Adult Inpatient Plan of Care  Goal: Plan of Care Review  Outcome: Progressing  Goal: Patient-Specific Goal (Individualized)  Outcome: Progressing  Goal: Absence of Hospital-Acquired Illness or Injury  Outcome: Progressing  Goal: Optimal Comfort and Wellbeing  Outcome: Progressing  Goal: Readiness for Transition of Care  Outcome: Progressing     Problem: Fall Injury Risk  Goal: Absence of Fall and Fall-Related Injury  Outcome: Progressing     Problem: Gastrointestinal Bleeding  Goal: Optimal Coping with Acute Illness  Outcome: Progressing

## 2024-07-21 NOTE — ASSESSMENT & PLAN NOTE
-white blood cell count 12.09, occult blood positive, sodium 145, potassium 4.3, creatinine 1.5, LFTs unremarkable, afebrile  -continue IV fluids  -Cdiff +, toxin negative, PCR +  Will start po vanco    Diarrhea resolved.  No abdominal pain okay to discharge home on p.o. vanc

## 2024-07-21 NOTE — DISCHARGE SUMMARY
[facility] Medicine  Discharge Summary      Patient Name: Mahin Newton  MRN: [id number]  KIRK: [id number]  Patient Class: IP- Inpatient  Admission Date: 7/19/2024  Hospital Length of Stay: 2 days  Discharge Date and Time: No discharge date for patient encounter.  Attending Physician: Faith Sampson MD   Discharging Provider: Faith Sampson MD  Primary Care Provider: Miranda, Primary Doctor    Primary Care Team: Networked reference to record PCT     HPI:   78-year-old  man with history of non-insulin-dependent diabetes mellitus type 2, hypertension, hyperlipidemia, gout, BPH, and obesity who [facility] due to abnormal blood work with anemia requiring transfusion.  Patient reports that he has had bright red blood per rectum intermittently over the last day, 6 episodes, moderate volume, no associated abdominal pain, and no bleeding elsewhere.  He does report diarrhea intermixed with the bleeding.  Patient had a colonoscopy approximately 1-2 years ago that was normal by his report.  Last colonoscopy in our system was 08/14/2017 with cecal polyp (tubular adenoma) and rectal polyp (inflammatory hyperplastic polyp).  Patient is on aspirin 81 mg daily and diclofenac.  He has on no other NSAIDs, antiplatelet therapy, or anticoagulation.  Patient reports that he was in bed and was getting up to go to the bathroom when he became dizzy and passed out.  He sustained no injuries.  He did lose consciousness for about 15 seconds per his wife who witnessed episode.  Orthostatic vital signs are positive in our emergency department.  Hemoglobin is 5.1.    Patient had recent outpatient urology appointment 06/24/2024 with urinalysis ordered.  Urine culture with Streptococcus agalactiae group B. Patient was prescribed Augmentin by his urologist on 06/27/2024 which he has completed.    Procedure(s) (LRB):  COLONOSCOPY (N/A)      Hospital Course:   Patient is a 71-year-old  male with a history of diabetes, hypertension, hyperlipidemia, gout, BPH who was [facility] after routine blood work showed a hemoglobin of 5.9.  While patient was attempting to get up out of the bed he had a syncopal episode; his wife called EMS and he presented.  He noted the past few days to have diarrhea with no associated abdominal pain but bright red blood per rectum.  He reports he had colonoscopy 1-2 years ago which was normal.  Patient's hemoglobin emergency department was 5.1.  He was admitted transfused 2 units of packed blood cells.  He had CTA performed which revealed hyperdense focus in the gastric fundus measuring 13 mm.  GI was consulted with plans to scope on 07/21 but C diff returned positive.  He was started on oral vancomycin and Colonoscopy was canceled.    Patient's diarrhea resolved  Hemoglobin remained stable  Discharged home on vancomycin 125 p.o. q.6 hours for a 10 day course  He is to follow up with his PCP in 3-5 days  Follow up with GI once colitis resolved for colonoscopy given significant anemia  Patient seen examined on day of discharge stable for discharge     Goals of Care Treatment Preferences:  Code Status: Full Code      Consults:   Consults (From admission, onward)          Status Ordering Provider     Inpatient consult to Gastroenterology  Once        Provider:  Abraham Ramires MD    Completed REBECA FARRAR JR            Cardiac/Vascular  Hyperlipidemia  Restart Lipitor      Primary hypertension  Chronic, controlled.  Hypotensive on arrival due to GI bleed.  Latest blood pressure and vitals reviewed-     Temp:  [97.8 °F (36.6 °C)-98.5 °F (36.9 °C)]   Pulse:  [68-97]   Resp:  [16-18]   BP: (130-141)/(67-81)   SpO2:  [96 %-99 %] .   Home meds for hypertension were reviewed and noted below.   Hypertension Medications               amLODIPine (NORVASC) 10 MG tablet Take 10 mg by mouth once daily.    lisinopriL (PRINIVIL,ZESTRIL) 40 MG tablet Take 40 mg by mouth  once daily.            While in the hospital, will manage blood pressure as follows; Adjust home antihypertensive regimen as follows- hold Norvasc and lisinopril.  P.r.n. IV hydralazine with parameters.    Will utilize p.r.n. blood pressure medication only if patient's blood pressure greater than 160/100 and he develops symptoms such as worsening chest pain or shortness of breath.    Hypotension  Hypotension related to severe anemia requiring transfusion  -transfuse packed red blood cells and give IV fluids  Resolved      Renal/  Renal insufficiency  Current creatinine 1.5.  Last labs in February 2017 with creatinine 1.2 with no interim labs to compare with.  -recent UTI with urine culture 06/24/2024 with Streptococcus agalactiae group B treated with Augmentin  -repeat urinalysis with reflex culture  -continue IV fluids  -restart lisinopril upon discharge      BPH (benign prostatic hyperplasia)  Hold Flomax      ID  Clostridium difficile infection    C diff PCR and antigen positive toxin negative  Initiate vancomycin 125mg  q.6 hours  Patient will need to follow up with GI after C diff cleared for colonoscopy    Endocrine  Diabetes mellitus type 2, noninsulin dependent  Patient's FSGs are uncontrolled due to hyperglycemia on current medication regimen.  Last A1c reviewed-   Lab Results   Component Value Date    HGBA1C 6.7 (H) 07/20/2024     Most recent fingerstick glucose reviewed-   Recent Labs   Lab 07/20/24  1714 07/20/24  2232 07/21/24  0619 07/21/24  1204   POCTGLUCOSE 130* 115* 100 119*       Current correctional scale  Low  Maintain anti-hyperglycemic dose as follows-   Antihyperglycemics (From admission, onward)      Start     Stop Route Frequency Ordered    07/19/24 2159  insulin aspart U-100 pen 0-5 Units         -- SubQ Before meals & nightly PRN 07/19/24 2100          Hold Oral hypoglycemics while patient is in the hospital.  Hold Jardiance.  Check A1c.    GI  * Lower GI bleed  Patient has acute blood  "loss due to hemorrhage, the hemorrhage is due to gastrointestinal bleed, patient does have a propensity for bleeding due to a medication, the medication is ASA and diclofenac.. Will trend hemoglobin/hematocrit Every 6 hours x4, as well as monitor and correct for any coagulation defects. CBC and vital signs have been reviewed and last CBC was noted-   Lab Results   Component Value Date    WBC 7.35 07/21/2024    HGB 9.2 (L) 07/21/2024    HCT 26.9 (L) 07/21/2024    MCV 81 (L) 07/21/2024     (L) 07/21/2024         Will order a type and screen and consent patient for blood transfusion. Will transfuse if Hgb is <7g/dl (<8g/dl in cases of active ACS) or if patient has rapid bleeding leading to hemodynamic instability.  -presumed lower GI bleed (diverticular) with bright red blood per rectum however CTA unable to completely exclude small gastric bleed.  -CTA acute GI bleed with Slightly hyperdense focus in the gastric fundus measuring 13 mm may relate to ingested material. Small gastric bleed not excluded. Small fat umbilical hernia. Colonic diverticulosis. Punctate nonobstructing left renal calculi."   -INR 1.1, occult blood positive  -previous hemoglobin was 13.2 in February 2017 with no interim labs to compare with  -advance diet, IV fluids, and transfuse 2 units packed red blood cells  -check H&H Q 6 hours x4 followed by CBC daily  -GI consult appreciated  -hold aspirin and NSAIDs  -patient transfused 4 units of packed blood cells    Hematochezia    Continue hemoglobin checks  C diff positive  As long as C diff resolves patient will not undergo colonoscopy during this admission  He will need to follow up for colonoscopy once C diff resolved    Diarrhea  -white blood cell count 12.09, occult blood positive, sodium 145, potassium 4.3, creatinine 1.5, LFTs unremarkable, afebrile  -continue IV fluids  -Cdiff +, toxin negative, PCR +  Will start po vanco    Diarrhea resolved.  No abdominal pain okay to discharge home " on p.o. vanc      Orthopedic  H/O: gout  Hold allopurinol        Final Active Diagnoses:    Diagnosis Date Noted POA    PRINCIPAL PROBLEM:  Lower GI bleed [K92.2] 07/19/2024 Yes    Hematochezia [K92.1] 07/20/2024 Yes    Clostridium difficile infection [A49.8] 07/20/2024 Yes    Hypotension [I95.9] 07/19/2024 Yes    Primary hypertension [I10] 07/19/2024 Yes    Diabetes mellitus type 2, noninsulin dependent [E11.9] 07/19/2024 Yes    Hyperlipidemia [E78.5] 07/19/2024 Yes    BPH (benign prostatic hyperplasia) [N40.0] 07/19/2024 Yes    Renal insufficiency [N28.9] 07/19/2024 Yes    H/O: gout [Z87.39] 07/19/2024 Yes    Diarrhea [R19.7] 07/19/2024 Yes      Problems Resolved During this Admission:    Diagnosis Date Noted Date Resolved POA    Symptomatic anemia [D64.9] 07/19/2024 07/21/2024 Yes    Syncope [R55] 07/19/2024 07/21/2024 Yes       Discharged Condition: fair    Disposition: Home or Self Care    Follow Up:   Follow-up Information       No, Primary Doctor Follow up in 3 day(s).    [facility] [place]                         Patient Instructions:      Ambulatory referral/consult to Gastroenterology   Standing Status: Future   Referral Priority: Routine Referral Type: Consultation   Referral Reason: Specialty Services Required   Requested Specialty: Gastroenterology   Number of Visits Requested: 1     Diet diabetic     Diet Cardiac     Notify your health care provider if you experience any of the following:  temperature >100.4     Notify your health care provider if you experience any of the following:  persistent nausea and vomiting or diarrhea     Notify your health care provider if you experience any of the following:  severe uncontrolled pain     Notify your health care provider if you experience any of the following:  persistent dizziness, light-headedness, or visual disturbances     Notify your health care provider if you experience any of the following:  increased confusion or  weakness     Activity as tolerated     Imaging Results              CTA Acute GI [place], Abdomen and Pelvis (Final result)  Result time 07/19/24 19:44:35      Final result by Alexis Howell MD (07/19/24 19:44:35)                   Impression:      Slightly hyperdense focus in the gastric fundus measuring 13 mm may relate to ingested material. Small gastric bleed not excluded. See for example series 2, image 40.    Small fat umbilical hernia    Colonic diverticulosis    Punctate nonobstructing left renal calculi    Recommend follow-up symptoms persist      Electronically signed by: Alexis Howell  Date:    07/19/2024  Time:    19:44               Narrative:    EXAMINATION:  CT angiography acute GI bleed abdomen pelvis    CLINICAL HISTORY:  GI bleeding    TECHNIQUE:  CT angiography of the abdomen pelvis with and without contrast.  3 minute delays were provided.  MIP imaging was utilized.  A total of 100 cc of Omnipaque 350 was injected.    COMPARISON:  None    FINDINGS:  Punctate nonobstructing left renal calculi. Chronic perinephric fat stranding. Atherosclerotic changes of the aorta iliac vasculature. Small fat containing umbilical hernia.  Bladder is mildly distended.  Normal appendix.  No bowel obstruction. Coronary artery calcification is identified. Mild subsegmental atelectasis.  Small pneumatoceles in the lung bases.  Spleen is grossly unremarkable.  Fatty infiltration of liver.  Left adrenal adenoma measures up to 17 mm.  Slightly hyperdense focus in the gastric fundus measuring 13 mm may relate to ingested material.  Small gastric bleed not excluded.  See for example series 2, image 40.                                      Significant Diagnostic Studies: Labs: CMP   Recent Labs   Lab 07/19/24  1649 07/20/24  0212    144   K 4.3 4.5   * 118*   CO2 20* 18*   * 145*   BUN 30* 30*   CREATININE 1.5* 1.4   CALCIUM 8.9 8.8   PROT 5.3* 5.1*   ALBUMIN 3.0* 3.0*   BILITOT 0.3 0.4   ALKPHOS 64 60    AST 15 14   ALT 14 14   ANIONGAP 9 8   , CBC   Recent Labs   Lab 07/19/24  1649 07/20/24  0212 07/20/24  0549 07/20/24  1919 07/21/24  0500 07/21/24  1159   WBC 12.09 10.26  --   --  7.35  --    HGB 5.1* 6.9*   < > 8.7* 8.2* 9.2*   HCT 18.1* 22.9*   < > 27.5* 26.9*  --     174  --   --  137*  --     < > = values in this interval not displayed.   , INR   Lab Results   Component Value Date    INR 1.1 07/19/2024    INR 1.1 02/06/2017   , Lipid Panel   Lab Results   Component Value Date    CHOL 191 03/25/2004    HDL 44.0 03/25/2004    LDLCALC 132.4 (H) 03/25/2004    TRIG 73 03/25/2004    CHOLHDL 23.0 03/25/2004   , A1C:   Recent Labs   Lab 07/20/24  0212   HGBA1C 6.7*   , and All labs within the past 24 hours have been reviewed    Pending Diagnostic Studies:       None           Medications:  Reconciled Home Medications:      Medication List        START taking these medications      famotidine 20 MG tablet  Commonly known as: PEPCID  Take 1 tablet (20 mg total) by mouth 2 (two) times daily.     vancomycin 125 mg/5 mL Soln  Take 5 mLs (125 mg total) by mouth every 6 (six) hours. for 13 days            CONTINUE taking these medications      allopurinoL 300 MG tablet  Commonly known as: ZYLOPRIM  Take 300 mg by mouth once daily.     amLODIPine 10 MG tablet  Commonly known as: NORVASC  Take 10 mg by mouth once daily.     atorvastatin 80 MG tablet  Commonly known as: LIPITOR  Take 80 mg by mouth once daily.     carboxymethylcellulose 0.5 % Dpet  Commonly known as: REFRESH PLUS  Place 0.4 drops into both eyes 3 (three) times daily as needed.     cyanocobalamin 1000 MCG tablet  Commonly known as: VITAMIN B-12  Take 100 mcg by mouth once daily.     JARDIANCE 25 mg tablet  Generic drug: empagliflozin  Take 25 mg by mouth once daily.     lisinopriL 40 MG tablet  Commonly known as: PRINIVIL,ZESTRIL  Take 40 mg by mouth once daily.     multivitamin with minerals tablet  Take 1 tablet by mouth once daily.     sertraline  100 MG tablet  Commonly known as: ZOLOFT  Take 100 mg by mouth once daily.     tamsulosin 0.4 mg Cap  Commonly known as: FLOMAX  Take 1 capsule (0.4 mg total) by mouth once daily.     VITAMIN D3 10 mcg (400 unit) Chew  Generic drug: cholecalciferol (vitamin D3)  Take by mouth. Take two tablets by mouth daily              Indwelling Lines/Drains at time of discharge:   Lines/Drains/Airways       None                   Time spent on the discharge of patient: 39 minutes         Faith Sampson MD  Department of Hospital Medicine  [facility] ([facility])

## 2024-07-21 NOTE — DISCHARGE INSTRUCTIONS
Call [facility] pharmacy at [phone] Monday a.m. to obtain your prescription vancomycin  Take all of vancomycin until complete  Returned to the hospital for any fever, chills, severe abdominal pain, worsening diarrhea.

## 2024-07-21 NOTE — ASSESSMENT & PLAN NOTE
Chronic, controlled.  Hypotensive on arrival due to GI bleed.  Latest blood pressure and vitals reviewed-     Temp:  [97.8 °F (36.6 °C)-98.5 °F (36.9 °C)]   Pulse:  [68-97]   Resp:  [16-18]   BP: (130-141)/(67-81)   SpO2:  [96 %-99 %] .   Home meds for hypertension were reviewed and noted below.   Hypertension Medications               amLODIPine (NORVASC) 10 MG tablet Take 10 mg by mouth once daily.    lisinopriL (PRINIVIL,ZESTRIL) 40 MG tablet Take 40 mg by mouth once daily.            While in the hospital, will manage blood pressure as follows; Adjust home antihypertensive regimen as follows- hold Norvasc and lisinopril.  P.r.n. IV hydralazine with parameters.    Will utilize p.r.n. blood pressure medication only if patient's blood pressure greater than 160/100 and he develops symptoms such as worsening chest pain or shortness of breath.

## 2024-07-21 NOTE — ASSESSMENT & PLAN NOTE
Patient's FSGs are uncontrolled due to hyperglycemia on current medication regimen.  Last A1c reviewed-   Lab Results   Component Value Date    HGBA1C 6.7 (H) 07/20/2024     Most recent fingerstick glucose reviewed-   Recent Labs   Lab 07/20/24  1714 07/20/24  2232 07/21/24  0619 07/21/24  1204   POCTGLUCOSE 130* 115* 100 119*       Current correctional scale  Low  Maintain anti-hyperglycemic dose as follows-   Antihyperglycemics (From admission, onward)    Start     Stop Route Frequency Ordered    07/19/24 2159  insulin aspart U-100 pen 0-5 Units         -- SubQ Before meals & nightly PRN 07/19/24 2100        Hold Oral hypoglycemics while patient is in the hospital.  Hold Jardiance.  Check A1c.

## 2024-07-21 NOTE — SUBJECTIVE & OBJECTIVE
Subjective:     Interval History: PCR came back positive for C. Diff.   Patient was started on vancomycin    Review of Systems   Gastrointestinal:  Positive for blood in stool.     Objective:     Vital Signs (Most Recent):  Temp: 98.5 °F (36.9 °C) (07/21/24 1129)  Pulse: 82 (07/21/24 1129)  Resp: 16 (07/21/24 1129)  BP: 137/79 (07/21/24 1129)  SpO2: (!) 94 % (07/21/24 1300) Vital Signs (24h Range):  Temp:  [97.8 °F (36.6 °C)-98.5 °F (36.9 °C)] 98.5 °F (36.9 °C)  Pulse:  [68-97] 82  Resp:  [16-18] 16  SpO2:  [94 %-99 %] 94 %  BP: (130-141)/(67-79) 137/79     Weight: 111 kg (244 lb 11.4 oz) (07/21/24 0722)  Body mass index is 35.11 kg/m².      Intake/Output Summary (Last 24 hours) at 7/21/2024 1547  Last data filed at 7/21/2024 0619  Gross per 24 hour   Intake 1663.92 ml   Output 900 ml   Net 763.92 ml       Lines/Drains/Airways       Peripheral Intravenous Line  Duration                  Peripheral IV - Single Lumen 07/19/24 20 G Right Antecubital 2 days         Peripheral IV - Single Lumen 07/19/24 1635 18 G Anterior;Left;Proximal Forearm 1 day                     Physical Exam  Constitutional:       Appearance: Normal appearance.   HENT:      Head: Normocephalic.   Eyes:      Conjunctiva/sclera: Conjunctivae normal.   Pulmonary:      Effort: Pulmonary effort is normal.   Abdominal:      General: There is no distension.      Palpations: Abdomen is soft.      Tenderness: There is no abdominal tenderness. There is no guarding.   Neurological:      Mental Status: He is alert.          Significant Labs:  CBC:   Recent Labs   Lab 07/19/24  1649 07/20/24  0212 07/20/24  0549 07/20/24  1919 07/21/24  0500 07/21/24  1159   WBC 12.09 10.26  --   --  7.35  --    HGB 5.1* 6.9* 6.5* 8.7* 8.2* 9.2*   HCT 18.1* 22.9* 21.7* 27.5* 26.9*  --     174  --   --  137*  --      CMP:   Recent Labs   Lab 07/20/24 0212   *   CALCIUM 8.8   ALBUMIN 3.0*   PROT 5.1*      K 4.5   CO2 18*   *   BUN 30*   CREATININE  1.4   ALKPHOS 60   ALT 14   AST 14   BILITOT 0.4     Coagulation:   Recent Labs   Lab 07/19/24  1649   INR 1.1         Significant Imaging:  Imaging results within the past 24 hours have been reviewed.

## 2024-07-21 NOTE — ASSESSMENT & PLAN NOTE
Hypotension related to severe anemia requiring transfusion  -transfuse packed red blood cells and give IV fluids  Resolved

## 2024-07-21 NOTE — ASSESSMENT & PLAN NOTE
"Patient has acute blood loss due to hemorrhage, the hemorrhage is due to gastrointestinal bleed, patient does have a propensity for bleeding due to a medication, the medication is ASA and diclofenac.. Will trend hemoglobin/hematocrit Every 6 hours x4, as well as monitor and correct for any coagulation defects. CBC and vital signs have been reviewed and last CBC was noted-   Lab Results   Component Value Date    WBC 7.35 07/21/2024    HGB 9.2 (L) 07/21/2024    HCT 26.9 (L) 07/21/2024    MCV 81 (L) 07/21/2024     (L) 07/21/2024         Will order a type and screen and consent patient for blood transfusion. Will transfuse if Hgb is <7g/dl (<8g/dl in cases of active ACS) or if patient has rapid bleeding leading to hemodynamic instability.  -presumed lower GI bleed (diverticular) with bright red blood per rectum however CTA unable to completely exclude small gastric bleed.  -CTA acute GI bleed with Slightly hyperdense focus in the gastric fundus measuring 13 mm may relate to ingested material. Small gastric bleed not excluded. Small fat umbilical hernia. Colonic diverticulosis. Punctate nonobstructing left renal calculi."   -INR 1.1, occult blood positive  -previous hemoglobin was 13.2 in February 2017 with no interim labs to compare with  -advance diet, IV fluids, and transfuse 2 units packed red blood cells  -check H&H Q 6 hours x4 followed by CBC daily  -GI consult appreciated  -hold aspirin and NSAIDs  -patient transfused 4 units of packed blood cells  "

## 2024-07-21 NOTE — ASSESSMENT & PLAN NOTE
-Please treat with Vancomycin PO for at least 10-14 days   -We will arrange follow-up in GI clinic

## 2024-07-21 NOTE — ASSESSMENT & PLAN NOTE
Current creatinine 1.5.  Last labs in February 2017 with creatinine 1.2 with no interim labs to compare with.  -recent UTI with urine culture 06/24/2024 with Streptococcus agalactiae group B treated with Augmentin  -repeat urinalysis with reflex culture  -continue IV fluids  -restart lisinopril upon discharge

## 2024-07-21 NOTE — PLAN OF CARE
[facility] - Telemetry (Hospital)  Discharge Final Note    Primary Care Provider: No, Primary Doctor    Expected Discharge Date: 7/21/2024    Final Discharge Note (most recent)       Final Note - 07/21/24 1425          Final Note    Assessment Type Final Discharge Note (P)      Anticipated Discharge Disposition Home or Self Care (P)         Post-Acute Status    Discharge Delays None known at this time (P)                      Important Message from Medicare             Contact Info       No, Primary Doctor   Relationship: PCP - General        Next Steps: Follow up in 3 day(s)    Instructions: follow up with PCP at [facility] in [place]          Discharge orders in.  No other orders for either DME or services.  No needs or discharge delays.

## 2024-07-21 NOTE — NURSING
A224/A224 OTTO Newton is a 78 y.o.male admitted on 7/19/2024 for Lower GI bleed   Code Status: Full Code MRN: [id number]   Review of patient's allergies indicates:  No Known Allergies  Past Medical History:   Diagnosis Date    Diabetes mellitus     Gout     Hyperlipidemia     Hypertension       PRN meds    0.9%  NaCl infusion (for blood administration), , Q24H PRN  0.9%  NaCl infusion (for blood administration), , Q24H PRN  acetaminophen, 650 mg, Q6H PRN  dextrose 10%, 12.5 g, PRN  dextrose 10%, 25 g, PRN  glucagon (human recombinant), 1 mg, PRN  glucose, 16 g, PRN  glucose, 24 g, PRN  hydrALAZINE, 10 mg, Q6H PRN  insulin aspart U-100, 0-5 Units, QID (AC + HS) PRN  naloxone, 0.02 mg, PRN  ondansetron, 4 mg, Q6H PRN  sodium chloride 0.9%, 10 mL, Q12H PRN      AVS Discharge instructions received and reviewed with pt and family at bedside.  Pt voiced understanding and all questions answered to satisfaction.  Stressed importance to making and keeping all follow up appointments.  Medications at bedside and reviewed with pt.  Tele monitor removed and brought to monitor tech.  IV d/c'd with tip intact, pressure dressing applied.  Pt transported to front of hospital via w/c to be discharged home.         Berkey Coma Scale Score: 15     Lead Monitored: Lead II Rhythm: normal sinus rhythm Frequency/Ectopy: PACs  Cardiac/Telemetry Box Number: [id number]  VTE Required Core Measure: Pharmacological prophylaxis initiated/maintained Last Bowel Movement: 07/21/24  Diet Cardiac Consistent Carbohydrate  Diet diabetic  Diet Cardiac     Sanjay Score: 21  Fall Risk Score: 6  Accucheck []   Freq?      Lines/Drains/Airways       None

## 2024-08-19 ENCOUNTER — OFFICE VISIT (OUTPATIENT)
Dept: UROLOGY | Facility: CLINIC | Age: 79
End: 2024-08-19
Payer: MEDICARE

## 2024-08-19 VITALS
DIASTOLIC BLOOD PRESSURE: 70 MMHG | HEART RATE: 89 BPM | HEIGHT: 70 IN | BODY MASS INDEX: 33.63 KG/M2 | WEIGHT: 234.88 LBS | SYSTOLIC BLOOD PRESSURE: 122 MMHG

## 2024-08-19 DIAGNOSIS — N30.00 ACUTE CYSTITIS WITHOUT HEMATURIA: Primary | ICD-10-CM

## 2024-08-19 LAB
BACTERIA #/AREA URNS AUTO: ABNORMAL /HPF
BILIRUB UR QL STRIP: NEGATIVE
BILIRUB UR QL STRIP: NEGATIVE
CLARITY UR REFRACT.AUTO: ABNORMAL
COLOR UR AUTO: YELLOW
GLUCOSE UR QL STRIP: ABNORMAL
GLUCOSE UR QL STRIP: POSITIVE
HGB UR QL STRIP: ABNORMAL
HYALINE CASTS UR QL AUTO: 0 /LPF
KETONES UR QL STRIP: NEGATIVE
KETONES UR QL STRIP: NEGATIVE
LEUKOCYTE ESTERASE UR QL STRIP: ABNORMAL
LEUKOCYTE ESTERASE UR QL STRIP: POSITIVE
MICROSCOPIC COMMENT: ABNORMAL
NITRITE UR QL STRIP: NEGATIVE
PH UR STRIP: 8 [PH] (ref 5–8)
PH, POC UA: 7
POC BLOOD, URINE: POSITIVE
POC NITRATES, URINE: NEGATIVE
PROT UR QL STRIP: ABNORMAL
PROT UR QL STRIP: POSITIVE
RBC #/AREA URNS AUTO: >100 /HPF (ref 0–4)
SP GR UR STRIP: 1.02 (ref 1–1.03)
SP GR UR STRIP: 1.02 (ref 1–1.03)
SQUAMOUS #/AREA URNS AUTO: 2 /HPF
URN SPEC COLLECT METH UR: ABNORMAL
UROBILINOGEN UR STRIP-ACNC: 0.2 (ref 0.3–2.2)
WBC #/AREA URNS AUTO: >100 /HPF (ref 0–5)
YEAST UR QL AUTO: ABNORMAL

## 2024-08-19 PROCEDURE — 81003 URINALYSIS AUTO W/O SCOPE: CPT | Mod: PBBFAC | Performed by: NURSE PRACTITIONER

## 2024-08-19 PROCEDURE — 99999 PR PBB SHADOW E&M-EST. PATIENT-LVL III: CPT | Mod: PBBFAC,,, | Performed by: NURSE PRACTITIONER

## 2024-08-19 PROCEDURE — 87086 URINE CULTURE/COLONY COUNT: CPT | Performed by: NURSE PRACTITIONER

## 2024-08-19 PROCEDURE — 99214 OFFICE O/P EST MOD 30 MIN: CPT | Mod: S$PBB,,, | Performed by: NURSE PRACTITIONER

## 2024-08-19 PROCEDURE — 81001 URINALYSIS AUTO W/SCOPE: CPT | Performed by: NURSE PRACTITIONER

## 2024-08-19 PROCEDURE — 99213 OFFICE O/P EST LOW 20 MIN: CPT | Mod: PBBFAC | Performed by: NURSE PRACTITIONER

## 2024-08-19 PROCEDURE — 99999PBSHW POCT URINALYSIS, DIPSTICK, AUTOMATED, W/O SCOPE: Mod: PBBFAC,,,

## 2024-08-19 RX ORDER — TAMSULOSIN HYDROCHLORIDE 0.4 MG/1
0.4 CAPSULE ORAL DAILY
Qty: 90 CAPSULE | Refills: 3 | Status: SHIPPED | OUTPATIENT
Start: 2024-08-19 | End: 2025-08-19

## 2024-08-19 NOTE — PROGRESS NOTES
Chief Complaint:   BPH    HPI:   Patient is presenting as a follow-up to BPH.  States that he ran out of tamsulosin in urinary stream has been slow and weak.  Reports that he was prescribed Augmentin for positive urinary tract infection, cause C diff, was hospitalized for 2 days.  Urine in clinic indicates blood and glucose, all other parameters are negative.  06/24/2024  Patient is a 78-year-old male that is presenting with slow urinary stream. Reports he has not been seen by urologist. No  cancers in his family. Denies gross hematuria. No BPH meds. Urine in clinic indicates leukocytes, blood and glucose, all other parameters are negative. PVR 75 mL. Has a remote history of renal stones. Denies flank pain.   Allergies:  Augmentin [amoxicillin-pot clavulanate]    Medications:  has a current medication list which includes the following prescription(s): allopurinol, amlodipine, atorvastatin, carboxymethylcellulose, cholecalciferol (vitamin d3), cyanocobalamin, empagliflozin, famotidine, lisinopril, multivitamin with minerals, sertraline, and tamsulosin.    Review of Systems:  General: No fever, chills, fatigability, or weight loss.  Skin: No rashes, itching, or changes in color or texture of skin.  Chest: Denies ZAPIEN, cyanosis, wheezing, cough, and sputum production.  Abdomen: Appetite fine. No weight loss. Denies diarrhea, abdominal pain, hematemesis, or blood in stool.  Musculoskeletal: No joint stiffness or swelling. Denies back pain.  : As above.  All other review of systems negative.    PMH:  has a past medical history of Diabetes mellitus, Gout, Hyperlipidemia, and Hypertension.  Renal stones, morbid obesity and BPH     PSH:  Laser lithotripsy     FamHx: none    SocHx:  reports that he has quit smoking. He does not have any smokeless tobacco history on file. He reports current alcohol use. He reports that he does not use drugs.      Physical Exam:  Vitals:    08/19/24 1538   BP: 122/70   Pulse: 89      General: A&Ox3, no apparent distress, no deformities  Neck: No masses, normal thyroid  Lungs: normal inspiration, no use of accessory muscles  Heart: normal pulse, no arrhythmias  Abdomen: Soft, NT, ND, no masses, no hernias, no hepatosplenomegaly  Lymphatic: Neck and groin nodes negative  Skin: The skin is warm and dry. No jaundice.  Ext: No c/c/e.    Labs/Studies:   See HPI    Impression/Plan:   Will be very cautious to prescribe antibiotics secondary to 10 day course of Augmentin caused inpatient C diff. urine sent for culture and urinalysis.  Patient to return to clinic in 2 weeks for re-evaluation.  Refill of tamsulosin was sent to [facility].

## 2024-08-21 ENCOUNTER — TELEPHONE (OUTPATIENT)
Dept: UROLOGY | Facility: CLINIC | Age: 79
End: 2024-08-21
Payer: MEDICARE

## 2024-08-21 ENCOUNTER — LAB VISIT (OUTPATIENT)
Dept: LAB | Facility: HOSPITAL | Age: 79
End: 2024-08-21
Attending: NURSE PRACTITIONER
Payer: MEDICARE

## 2024-08-21 DIAGNOSIS — R31.29 MICROHEMATURIA: Primary | ICD-10-CM

## 2024-08-21 DIAGNOSIS — R31.29 MICROHEMATURIA: ICD-10-CM

## 2024-08-21 LAB
BACTERIA #/AREA URNS AUTO: ABNORMAL /HPF
BACTERIA UR CULT: NORMAL
BACTERIA UR CULT: NORMAL
BILIRUB UR QL STRIP: NEGATIVE
CLARITY UR REFRACT.AUTO: ABNORMAL
COLOR UR AUTO: YELLOW
GLUCOSE UR QL STRIP: ABNORMAL
HGB UR QL STRIP: ABNORMAL
KETONES UR QL STRIP: NEGATIVE
LEUKOCYTE ESTERASE UR QL STRIP: ABNORMAL
MICROSCOPIC COMMENT: ABNORMAL
NITRITE UR QL STRIP: NEGATIVE
PH UR STRIP: 6 [PH] (ref 5–8)
PROT UR QL STRIP: ABNORMAL
RBC #/AREA URNS AUTO: >100 /HPF (ref 0–4)
SP GR UR STRIP: 1.02 (ref 1–1.03)
SQUAMOUS #/AREA URNS AUTO: 1 /HPF
URN SPEC COLLECT METH UR: ABNORMAL
WBC #/AREA URNS AUTO: >100 /HPF (ref 0–5)
WBC CLUMPS UR QL AUTO: ABNORMAL
YEAST UR QL AUTO: ABNORMAL

## 2024-08-21 PROCEDURE — 87088 URINE BACTERIA CULTURE: CPT | Performed by: NURSE PRACTITIONER

## 2024-08-21 PROCEDURE — 87086 URINE CULTURE/COLONY COUNT: CPT | Performed by: NURSE PRACTITIONER

## 2024-08-21 PROCEDURE — 81001 URINALYSIS AUTO W/SCOPE: CPT | Performed by: NURSE PRACTITIONER

## 2024-08-21 PROCEDURE — 87147 CULTURE TYPE IMMUNOLOGIC: CPT | Performed by: NURSE PRACTITIONER

## 2024-08-21 NOTE — TELEPHONE ENCOUNTER
Per Ms Alejandrina' request, I  contacted pt and asked him to go to the lab to repeat urine culture; orders placed and pt confirmed.

## 2024-08-23 LAB — BACTERIA UR CULT: ABNORMAL

## 2024-08-26 ENCOUNTER — TELEPHONE (OUTPATIENT)
Facility: CLINIC | Age: 79
End: 2024-08-26
Payer: MEDICARE

## 2024-08-26 RX ORDER — CEPHALEXIN 500 MG/1
500 CAPSULE ORAL EVERY 12 HOURS
Qty: 20 CAPSULE | Refills: 0 | Status: SHIPPED | OUTPATIENT
Start: 2024-08-26 | End: 2024-09-05

## 2024-08-26 NOTE — TELEPHONE ENCOUNTER
Called patient no answer LVM         ----- Message from Gayla Pena NP sent at 8/26/2024  4:11 PM CDT -----  Please call patient and him that urinalysis continues to indicate microscopic hematuria and urine culture indicated Streptococcus.  I am aware that he can not tolerate Augmentin, Keflex antibiotics have been sent to his pharmacy.